# Patient Record
Sex: FEMALE | Race: WHITE | NOT HISPANIC OR LATINO | Employment: OTHER | URBAN - METROPOLITAN AREA
[De-identification: names, ages, dates, MRNs, and addresses within clinical notes are randomized per-mention and may not be internally consistent; named-entity substitution may affect disease eponyms.]

---

## 2023-10-24 ENCOUNTER — OFFICE VISIT (OUTPATIENT)
Dept: PRIMARY CARE | Facility: CLINIC | Age: 83
End: 2023-10-24
Payer: MEDICARE

## 2023-10-24 VITALS
SYSTOLIC BLOOD PRESSURE: 132 MMHG | HEART RATE: 73 BPM | TEMPERATURE: 98 F | OXYGEN SATURATION: 98 % | WEIGHT: 123 LBS | DIASTOLIC BLOOD PRESSURE: 58 MMHG | RESPIRATION RATE: 16 BRPM

## 2023-10-24 DIAGNOSIS — R53.83 MALAISE AND FATIGUE: ICD-10-CM

## 2023-10-24 DIAGNOSIS — M19.90 ARTHRITIS: ICD-10-CM

## 2023-10-24 DIAGNOSIS — Z78.0 POSTMENOPAUSAL: ICD-10-CM

## 2023-10-24 DIAGNOSIS — E78.5 HYPERLIPIDEMIA, UNSPECIFIED HYPERLIPIDEMIA TYPE: ICD-10-CM

## 2023-10-24 DIAGNOSIS — R53.81 MALAISE AND FATIGUE: ICD-10-CM

## 2023-10-24 PROCEDURE — 1036F TOBACCO NON-USER: CPT | Performed by: FAMILY MEDICINE

## 2023-10-24 PROCEDURE — 1159F MED LIST DOCD IN RCRD: CPT | Performed by: FAMILY MEDICINE

## 2023-10-24 PROCEDURE — 99203 OFFICE O/P NEW LOW 30 MIN: CPT | Performed by: FAMILY MEDICINE

## 2023-10-24 RX ORDER — GLUCOSAMINE/CHONDRO SU A 500-400 MG
1 TABLET ORAL 3 TIMES DAILY
COMMUNITY

## 2023-10-24 RX ORDER — DIPHENHYDRAMINE HCL 50 MG
50 CAPSULE ORAL NIGHTLY PRN
COMMUNITY
End: 2024-04-23

## 2023-10-24 RX ORDER — CALCIUM CARB/MAGNESIUM CARB 311-232MG
TABLET ORAL
COMMUNITY

## 2023-10-24 RX ORDER — UBIDECARENONE 75 MG
500 CAPSULE ORAL DAILY
COMMUNITY

## 2023-10-24 RX ORDER — ATORVASTATIN CALCIUM 20 MG/1
20 TABLET, FILM COATED ORAL DAILY
COMMUNITY
End: 2024-04-23 | Stop reason: SDUPTHER

## 2023-10-24 RX ORDER — CHOLECALCIFEROL (VITAMIN D3) 25 MCG
1000 TABLET ORAL DAILY
COMMUNITY

## 2023-10-24 RX ORDER — LISINOPRIL 10 MG/1
10 TABLET ORAL DAILY
COMMUNITY
End: 2024-04-23 | Stop reason: SDUPTHER

## 2023-10-24 RX ORDER — BISMUTH SUBSALICYLATE 262 MG
1 TABLET,CHEWABLE ORAL DAILY
COMMUNITY

## 2023-10-24 RX ORDER — ASCORBIC ACID 500 MG
500 TABLET ORAL DAILY
COMMUNITY

## 2023-10-24 RX ORDER — ASPIRIN 81 MG/1
81 TABLET ORAL DAILY
COMMUNITY

## 2023-10-24 RX ORDER — METOPROLOL TARTRATE 50 MG/1
50 TABLET ORAL 2 TIMES DAILY
COMMUNITY
End: 2024-04-23 | Stop reason: SDUPTHER

## 2023-10-24 ASSESSMENT — PATIENT HEALTH QUESTIONNAIRE - PHQ9
2. FEELING DOWN, DEPRESSED OR HOPELESS: NOT AT ALL
1. LITTLE INTEREST OR PLEASURE IN DOING THINGS: NOT AT ALL
SUM OF ALL RESPONSES TO PHQ9 QUESTIONS 1 AND 2: 0

## 2023-10-24 NOTE — PROGRESS NOTES
Subjective   Patient ID: Lorraine Loja is a 83 y.o. female who presents for [facility].    HPI  Presents today for above reason  Last PCP: Dr Ponce. Pt moved from [place] to [place]  How did pt find us: former pt  Last eye exam: 2 years  Last dental: 2 months  Eating healthy. Including: chicken, fish, fruit, vegetables  Little exercise  Sleeping well    Pt has significant family hx of breast cancer  Pt is in need of hearing aids, not sure if she needs referral    No other concerns today    Yearly BW ordered  DEXA screen ordered      Review of Systems  Constitutional:  no chills, no fever and no night sweats.  Eyes: no blurred vision and no eyesight problems.  ENT: no hearing loss, no nasal congestion, no hoarseness and no sore throat.  Neck: no mass (es) and no swelling.  Cardiovascular: no chest pain, no intermittent leg claudication, no lower extremity edema, no palpitation and no syncope.  Respiratory: no cough, no shortness of breath during exertion, no shortness of breath at rest and no wheezing.  Gastrointestinal: no abdominal pain, no blood in stools, no constipation, no diarrhea, no melena, no nausea, no rectal pain and no vomiting.  Genitourinary: no dysuria, no change in urinary frequency, no urinary hesitancy and no feelings of urinary urgency.  Musculoskeletal: no arthralgias, no back pain and no myalgias.  Integumentary: no new skin lesions and no rashes.  Neurological: no difficulty walking, no headache, no limb weakness, no numbness and no tingling.  Psychiatric/Behavioral: no anxiety, no depression, no anhedonia and no substance use disorders.  Endocrine: no recent weight gain and no recent weight loss.  Hematologic/Lymphatic: no tendency for easy bruising and no swollen glands    Objective   Physical Exam  Patiently recently moved back from [place] originally from [place] here to be closer to her grandchildren  She was a patient of ours before she moved she wants to reestablish care.   "History of hypertension and hyperlipidemia otherwise doing well send B12 deficiency also controlled with oral medication.  Well-developed well-nourished thin 83-year-old in no acute distress.  Physical exam today's office visit constitutional alert and oriented x3.    Head is atraumatic HEENT is within normal limits.    Neck supple no masses full range of motion.    Thyroid is normal in size no thyromegaly there is no carotid bruits.    Pulmonary exam shows clear to auscultation no respiratory distress.    Cardiovascular shows no murmur rub or gallop.  Regular rate and rhythm.    Abdominal exam soft nontender no hepatosplenomegaly or masses normal bowel sounds no rebound no guarding.    Musculoskeletal exam no joint pain no muscle pain full range of motion.    Psych exam normal mood and affect.    Dermatologic exam no skin lesions no rash no blemishes.    Neuro exam is no focal deficits.  Normal exam.    Extremities no edema normal pulses normal capillary refill.    /58   Pulse 73   Temp 36.7 °C (98 °F)   Resp 16   Wt 55.8 kg (123 lb)   SpO2 98%     No results found for: \"WBC\", \"HGB\", \"HCT\", \"MCV\", \"PLT\"    Assessment/Plan she is due to get baseline blood work and also bone density test done we will get those accomplished and call her with the results if she is doing well routine recheck 6 months or as needed  Problem List Items Addressed This Visit    None  Visit Diagnoses       Postmenopausal        Malaise and fatigue              "

## 2023-11-02 ENCOUNTER — LAB (OUTPATIENT)
Dept: LAB | Facility: LAB | Age: 83
End: 2023-11-02
Payer: MEDICARE

## 2023-11-02 DIAGNOSIS — R53.81 MALAISE AND FATIGUE: ICD-10-CM

## 2023-11-02 DIAGNOSIS — R53.83 MALAISE AND FATIGUE: ICD-10-CM

## 2023-11-02 DIAGNOSIS — E78.5 HYPERLIPIDEMIA, UNSPECIFIED HYPERLIPIDEMIA TYPE: ICD-10-CM

## 2023-11-02 LAB
ALBUMIN SERPL BCP-MCNC: 4.1 G/DL (ref 3.4–5)
ALP SERPL-CCNC: 58 U/L (ref 33–136)
ALT SERPL W P-5'-P-CCNC: 23 U/L (ref 7–45)
ANION GAP SERPL CALC-SCNC: 12 MMOL/L (ref 10–20)
AST SERPL W P-5'-P-CCNC: 25 U/L (ref 9–39)
BASOPHILS # BLD AUTO: 0.09 X10*3/UL (ref 0–0.1)
BASOPHILS NFR BLD AUTO: 1 %
BILIRUB SERPL-MCNC: 0.6 MG/DL (ref 0–1.2)
BUN SERPL-MCNC: 19 MG/DL (ref 6–23)
CALCIUM SERPL-MCNC: 10.1 MG/DL (ref 8.6–10.3)
CHLORIDE SERPL-SCNC: 103 MMOL/L (ref 98–107)
CHOLEST SERPL-MCNC: 164 MG/DL (ref 0–199)
CHOLESTEROL/HDL RATIO: 3.5
CO2 SERPL-SCNC: 29 MMOL/L (ref 21–32)
CREAT SERPL-MCNC: 0.79 MG/DL (ref 0.5–1.05)
EOSINOPHIL # BLD AUTO: 0.06 X10*3/UL (ref 0–0.4)
EOSINOPHIL NFR BLD AUTO: 0.7 %
ERYTHROCYTE [DISTWIDTH] IN BLOOD BY AUTOMATED COUNT: 12.5 % (ref 11.5–14.5)
GFR SERPL CREATININE-BSD FRML MDRD: 75 ML/MIN/1.73M*2
GLUCOSE SERPL-MCNC: 99 MG/DL (ref 74–99)
HCT VFR BLD AUTO: 42.3 % (ref 36–46)
HDLC SERPL-MCNC: 46.8 MG/DL
HGB BLD-MCNC: 13.9 G/DL (ref 12–16)
IMM GRANULOCYTES # BLD AUTO: 0.02 X10*3/UL (ref 0–0.5)
IMM GRANULOCYTES NFR BLD AUTO: 0.2 % (ref 0–0.9)
LDLC SERPL CALC-MCNC: 88 MG/DL
LYMPHOCYTES # BLD AUTO: 2.08 X10*3/UL (ref 0.8–3)
LYMPHOCYTES NFR BLD AUTO: 24.2 %
MCH RBC QN AUTO: 31.9 PG (ref 26–34)
MCHC RBC AUTO-ENTMCNC: 32.9 G/DL (ref 32–36)
MCV RBC AUTO: 97 FL (ref 80–100)
MONOCYTES # BLD AUTO: 0.81 X10*3/UL (ref 0.05–0.8)
MONOCYTES NFR BLD AUTO: 9.4 %
NEUTROPHILS # BLD AUTO: 5.55 X10*3/UL (ref 1.6–5.5)
NEUTROPHILS NFR BLD AUTO: 64.5 %
NON HDL CHOLESTEROL: 117 MG/DL (ref 0–149)
NRBC BLD-RTO: 0 /100 WBCS (ref 0–0)
PLATELET # BLD AUTO: 416 X10*3/UL (ref 150–450)
POTASSIUM SERPL-SCNC: 4.6 MMOL/L (ref 3.5–5.3)
PROT SERPL-MCNC: 6.9 G/DL (ref 6.4–8.2)
RBC # BLD AUTO: 4.36 X10*6/UL (ref 4–5.2)
SODIUM SERPL-SCNC: 139 MMOL/L (ref 136–145)
TRIGL SERPL-MCNC: 147 MG/DL (ref 0–149)
VLDL: 29 MG/DL (ref 0–40)
WBC # BLD AUTO: 8.6 X10*3/UL (ref 4.4–11.3)

## 2023-11-02 PROCEDURE — 80053 COMPREHEN METABOLIC PANEL: CPT

## 2023-11-02 PROCEDURE — 85025 COMPLETE CBC W/AUTO DIFF WBC: CPT

## 2023-11-02 PROCEDURE — 80061 LIPID PANEL: CPT

## 2023-11-02 PROCEDURE — 36415 COLL VENOUS BLD VENIPUNCTURE: CPT

## 2023-11-06 ENCOUNTER — APPOINTMENT (OUTPATIENT)
Dept: RADIOLOGY | Facility: CLINIC | Age: 83
End: 2023-11-06
Payer: MEDICARE

## 2023-12-12 ENCOUNTER — OFFICE VISIT (OUTPATIENT)
Dept: PRIMARY CARE | Facility: CLINIC | Age: 83
End: 2023-12-12
Payer: MEDICARE

## 2023-12-12 ENCOUNTER — OFFICE VISIT (OUTPATIENT)
Dept: ORTHOPEDIC SURGERY | Facility: CLINIC | Age: 83
End: 2023-12-12
Payer: MEDICARE

## 2023-12-12 ENCOUNTER — ANCILLARY PROCEDURE (OUTPATIENT)
Dept: RADIOLOGY | Facility: CLINIC | Age: 83
End: 2023-12-12
Payer: MEDICARE

## 2023-12-12 VITALS
RESPIRATION RATE: 16 BRPM | TEMPERATURE: 97.7 F | SYSTOLIC BLOOD PRESSURE: 122 MMHG | HEART RATE: 68 BPM | OXYGEN SATURATION: 99 % | DIASTOLIC BLOOD PRESSURE: 60 MMHG | WEIGHT: 122.2 LBS

## 2023-12-12 DIAGNOSIS — S43.004S SHOULDER DISLOCATION, RIGHT, SEQUELA: ICD-10-CM

## 2023-12-12 DIAGNOSIS — S42.301A CLOSED FRACTURE OF SHAFT OF RIGHT HUMERUS, UNSPECIFIED FRACTURE MORPHOLOGY, INITIAL ENCOUNTER: ICD-10-CM

## 2023-12-12 DIAGNOSIS — S43.004A DISLOCATION OF RIGHT SHOULDER JOINT, INITIAL ENCOUNTER: Primary | ICD-10-CM

## 2023-12-12 PROCEDURE — 1159F MED LIST DOCD IN RCRD: CPT | Performed by: FAMILY MEDICINE

## 2023-12-12 PROCEDURE — 1160F RVW MEDS BY RX/DR IN RCRD: CPT | Performed by: FAMILY MEDICINE

## 2023-12-12 PROCEDURE — 1036F TOBACCO NON-USER: CPT | Performed by: FAMILY MEDICINE

## 2023-12-12 PROCEDURE — 73030 X-RAY EXAM OF SHOULDER: CPT | Mod: RT,FY

## 2023-12-12 PROCEDURE — 99213 OFFICE O/P EST LOW 20 MIN: CPT | Performed by: FAMILY MEDICINE

## 2023-12-12 PROCEDURE — 99204 OFFICE O/P NEW MOD 45 MIN: CPT | Performed by: FAMILY MEDICINE

## 2023-12-12 PROCEDURE — 73030 X-RAY EXAM OF SHOULDER: CPT | Mod: RIGHT SIDE | Performed by: FAMILY MEDICINE

## 2023-12-12 PROCEDURE — 99214 OFFICE O/P EST MOD 30 MIN: CPT | Performed by: FAMILY MEDICINE

## 2023-12-12 RX ORDER — CYCLOBENZAPRINE HCL 5 MG
5 TABLET ORAL NIGHTLY
Qty: 14 TABLET | Refills: 0 | Status: SHIPPED | OUTPATIENT
Start: 2023-12-12 | End: 2023-12-26

## 2023-12-12 RX ORDER — CYCLOBENZAPRINE HCL 5 MG
5 TABLET ORAL NIGHTLY
Qty: 14 TABLET | Refills: 0 | Status: CANCELLED | OUTPATIENT
Start: 2023-12-12 | End: 2023-12-26

## 2023-12-12 NOTE — PROGRESS NOTES
Subjective   Patient ID: Lorraine Loja is a 83 y.o. female who presents for Hospital Follow-up and dislocated shoulder.    HPI  Pt reports above concern FOR OFFICE VISIT  Pt was seen at [facility] 12/11/2023  Pt tripped on sidewalk over a brick and fell on RIGHT side  Xray showed dislocation of shoulder  Pt reports: soreness present  Pt has tried:using Advil/Tylenol duel    No new concerns    Review of Systems  Constitutional:  no chills, no fever and no night sweats.  Eyes: no blurred vision and no eyesight problems.  ENT: no hearing loss, no nasal congestion, no hoarseness and no sore throat.  Neck: no mass (es) and no swelling.  Cardiovascular: no chest pain, no intermittent leg claudication, no lower extremity edema, no palpitation and no syncope.  Respiratory: no cough, no shortness of breath during exertion, no shortness of breath at rest and no wheezing.  Gastrointestinal: no abdominal pain, no blood in stools, no constipation, no diarrhea, no melena, no nausea, no rectal pain and no vomiting.  Genitourinary: no dysuria, no change in urinary frequency, no urinary hesitancy and no feelings of urinary urgency.  Musculoskeletal: no arthralgias, no back pain and no myalgias.  Integumentary: no new skin lesions and no rashes.  Neurological: no difficulty walking, no headache, no limb weakness, no numbness and no tingling.  Psychiatric/Behavioral: no anxiety, no depression, no anhedonia and no substance use disorders.  Endocrine: no recent weight gain and no recent weight loss.  Hematologic/Lymphatic: no tendency for easy bruising and no swollen glands    Objective   Physical Exam  Patient in for follow-up was in [facility] visiting her grandson at his college tripped and fell and had an injury was seen to the emergency room in [place] dislocated right shoulder and found to have an old fracture of the humerus nonhealing says that happened 10 years ago.  The shoulder was reduced she has pain  and tenderness she is in a splint sling advised to follow-up with orthopedics pain is well-controlled she has been using some Motrin and some Tylenol lungs clear cardiac and abdominal exams are benign.  /60   Pulse 68   Temp 36.5 °C (97.7 °F)   Resp 16   Wt 55.4 kg (122 lb 3.2 oz)   SpO2 99%     Lab Results   Component Value Date    WBC 8.6 11/02/2023    HGB 13.9 11/02/2023    HCT 42.3 11/02/2023    MCV 97 11/02/2023     11/02/2023       Assessment/Plan plan will get her into see orthopedics this afternoon for evaluation and recommendation I will follow-up with her in 2 to 3 weeks.  Problem List Items Addressed This Visit    None

## 2023-12-12 NOTE — PROGRESS NOTES
Acute Injury New Patient Visit    CC:   Chief Complaint   Patient presents with    Right Shoulder - Pain     Rt shoulder dislocation 12/8/23  Xrays and reduction in [place]  Xrays today       HPI: Lorraine is a 83 y.o.female who presents today with new complaints of right shoulder pain status post dislocation and fall.  She had her shoulder reduced outside in [place].  She presents here today for further evaluation.  She denies any numbness tingling or burning no history of prior injury or trauma outside this event where she did slip and fall in the snow and ice.  She presents with isolated injury to the right shoulder only.        Review of Systems   GENERAL: Negative for malaise, significant weight loss, fever  MUSCULOSKELETAL: See HPI  NEURO: Negative for numbness / tingling     Past Medical History  Past Medical History:   Diagnosis Date    HTN (hypertension)     Hyperlipidemia        Medication review  Medication Documentation Review Audit       Reviewed by Cole C Budinsky, MD (Physician) on 12/12/23 at 1647      Medication Order Taking? Sig Documenting Provider Last Dose Status   ascorbic acid (Vitamin C) 500 mg tablet [id number]  Take 1 tablet (500 mg) by mouth once daily. Historical Provider, MD  Active   aspirin 81 mg EC tablet [id number]  Take 1 tablet (81 mg) by mouth once daily. Historical Provider, MD  Active   atorvastatin (Lipitor) 20 mg tablet [id number]  Take 1 tablet (20 mg) by mouth once daily. Historical Provider, MD  Active   CALCIUM CITRATE ORAL [id number]  Take 400 mg by mouth. Historical Provider, MD  Active   cholecalciferol (Vitamin D3) 25 MCG (1000 UT) tablet [id number]  Take 1 tablet (1,000 Units) by mouth once daily. Historical Provider, MD  Active   cyanocobalamin (Vitamin B-12) 500 mcg tablet [id number]  Take 1 tablet (500 mcg) by mouth once daily. Historical Provider, MD  Active   cyclobenzaprine (Flexeril) 5 mg tablet [id number]  Take 1 tablet (5 mg) by mouth once daily at bedtime for 14  days. Cole C Budinsky, MD  Active   diphenhydrAMINE (BENADryl) 50 mg capsule [id number]  Take 1 capsule (50 mg) by mouth as needed at bedtime for itching. Historical Provider, MD  Active   glucosamine-chondroitin 500-400 mg tablet [id number]  Take 1 tablet by mouth 3 times a day. Historical Provider, MD  Active   lisinopril 10 mg tablet [id number]  Take 1 tablet (10 mg) by mouth once daily. Historical Provider, MD  Active   melatonin 5 mg tablet,disintegrating [id number]  Take by mouth. Historical Provider, MD  Active   metoprolol tartrate (Lopressor) 50 mg tablet [id number]  Take 1 tablet by mouth 2 times a day. Historical Provider, MD  Active   multivitamin tablet [id number]  Take 1 tablet by mouth once daily. Historical Provider, MD  Active                    Allergies  Allergies   Allergen Reactions    Sulfa (Sulfonamide Antibiotics) Other     Oral rash       Social History  Social History     Socioeconomic History    Marital status:      Spouse name: Not on file    Number of children: Not on file    Years of education: Not on file    Highest education level: Not on file   Occupational History    Not on file   Tobacco Use    Smoking status: Never    Smokeless tobacco: Never   Vaping Use    Vaping Use: Never used   Substance and Sexual Activity    Alcohol use: Yes     Comment: rare    Drug use: Never    Sexual activity: Not on file   Other Topics Concern    Not on file   Social History Narrative    Not on file     Social Determinants of Health     Financial Resource Strain: Not on file   Food Insecurity: Not on file   Transportation Needs: Not on file   Physical Activity: Not on file   Stress: Not on file   Social Connections: Not on file   Intimate Partner Violence: Not on file   Housing Stability: Not on file       Surgical History  Past Surgical History:   Procedure Laterality Date    JOINT REPLACEMENT         Physical Exam:  GENERAL:  Patient is awake, alert, and oriented to person place and time.  Patient  appears well nourished and well kept.  Affect Calm, Not Acutely Distressed.  HEENT:  Normocephalic, Atraumatic, EOMI  CARDIOVASCULAR:  Hemodynamically stable.  RESPIRATORY:  Normal respirations with unlabored breathing.  NEURO: Gross sensation intact to the upper extremities bilaterally.  Extremity: Shoulder exam limited range of motion and strength secondary to injury distal pulses and sensation are intact forearm compartment soft compressible full range of motion about the elbow normal pronation supination no laxity with valgus stress.  No pain at the distal clavicle or AC joint.  Mild global tenderness circumferentially about the proximal humerus.  Limited range of motion 45 degrees forward flexion 45 lateral abduction.      Diagnostics: Right shoulder films demonstrate normal-appearing glenohumeral joint.        Procedure: None  Procedures    Assessment:   Problem List Items Addressed This Visit    None  Visit Diagnoses       Shoulder dislocation, right, sequela        Relevant Medications    cyclobenzaprine (Flexeril) 5 mg tablet    Other Relevant Orders    XR shoulder right 2+ views             Plan: At this time we will offer the patient a short course of a muscle relaxer in addition to relative rest with sling on x 3 weeks.  Will see her back in 3 weeks for repeat evaluation we will defer repeat x-rays unless there is more injury or new injury or trauma.  Will likely transition out of the sling at that time and have her enter into a short course of physical therapy.  We discussed the nonoperative nature of her injury which she is in agreement of today.  Orders Placed This Encounter    XR shoulder right 2+ views    cyclobenzaprine (Flexeril) 5 mg tablet      At the conclusion of the visit there were no further questions by the patient/family regarding their plan of care.  Patient was instructed to call or return with any issues, questions, or concerns regarding their injury and/or treatment plan described  above.     12/12/23 at 4:48 PM - Cole C Budinsky, MD    Office: [phone]    This note was prepared using voice recognition software.  The details of this note are correct and have been reviewed, and corrected to the best of my ability.  Some grammatical errors may persist related to the Dragon software.

## 2024-01-05 ENCOUNTER — OFFICE VISIT (OUTPATIENT)
Dept: ORTHOPEDIC SURGERY | Facility: CLINIC | Age: 84
End: 2024-01-05
Payer: MEDICARE

## 2024-01-05 DIAGNOSIS — S43.004S SHOULDER DISLOCATION, RIGHT, SEQUELA: ICD-10-CM

## 2024-01-05 PROCEDURE — 1159F MED LIST DOCD IN RCRD: CPT | Performed by: FAMILY MEDICINE

## 2024-01-05 PROCEDURE — 99213 OFFICE O/P EST LOW 20 MIN: CPT | Performed by: FAMILY MEDICINE

## 2024-01-05 PROCEDURE — 1160F RVW MEDS BY RX/DR IN RCRD: CPT | Performed by: FAMILY MEDICINE

## 2024-01-05 PROCEDURE — 1036F TOBACCO NON-USER: CPT | Performed by: FAMILY MEDICINE

## 2024-01-05 NOTE — PROGRESS NOTES
Established Patient Follow-Up Visit    CC:   Chief Complaint   Patient presents with    Right Shoulder - Follow-up     Dislocation  DOI: 12/8/23   Re evaluate today       HPI:  Lorraine is a 83 y.o. female returns here today for follow-up visit regarding: Right shoulder pain dislocation.  Presents here today for follow-up.  She states a little bit of soreness has improved range of motion has been weaning herself from the sling.  States a little bit of intermittent burning but denies numbness or tingling.          REVIEW OF SYSTEMS:  GENERAL: Negative for malaise, significant weight loss, fever  MUSCULOSKELETAL: See HPI  NEURO: Negative for numbness / tingling       PHYSICAL EXAM:  -Neuro: Gross sensation intact to the upper extremities bilaterally.  -Extremity: Right shoulder demonstrates mild soft tissue tenderness over the subacromial bursa and deltoid.  There is no anterior pain she has full forward flexion equal and symmetric bilaterally at 90 degrees abduction without any issue normal internal and external rotation.  Biceps triceps and forearm compartment soft compressible with 5 out of 5 strength throughout the remainder the right upper extremity.    IMAGING: No new images today      PROCEDURE: None  Procedures     ASSESSMENT:   Follow-up visit for:  Problem List Items Addressed This Visit    None  Visit Diagnoses       Shoulder dislocation, right, sequela        Relevant Orders    Referral to Physical Therapy             PLAN: At this time we will transition patient into physical therapy we will see her back in 6 weeks for repeat evaluation if still having pain and discomfort at that time we discussed the ability to provide her with a steroid injection.  She may wean from the sling as able.  She can use ice heat topical muscle rubs creams as well for any soft tissue discomfort.  Additionally patient was curious about upcoming dental surgery which is very pricey and she is worried about the screws that may  not be able to take she states a history of delayed healing and poor bone.  Recommended she follow-up with her primary care doc for further workup and evaluation and potential labs and bone density scans prior to making the decision to go forward with an expensive procedure in her mouth.  Orders Placed This Encounter    Referral to Physical Therapy           At the conclusion of the visit there were no further questions by the patient/family regarding their plan of care.  Patient was instructed to call or return with any issues, questions, or concerns regarding their injury and/or treatment plan described above.     01/05/24 at 10:22 AM - Cole C Budinsky, MD    Office: [phone]    This note was prepared using voice recognition software.  The details of this note are correct and have been reviewed, and corrected to the best of my ability.  Some grammatical errors may persist related to the Dragon software.

## 2024-01-10 ENCOUNTER — EVALUATION (OUTPATIENT)
Dept: PHYSICAL THERAPY | Facility: CLINIC | Age: 84
End: 2024-01-10
Payer: MEDICARE

## 2024-01-10 DIAGNOSIS — S43.004D CLOSED DISLOCATION OF RIGHT SHOULDER, SUBSEQUENT ENCOUNTER: ICD-10-CM

## 2024-01-10 PROCEDURE — 97110 THERAPEUTIC EXERCISES: CPT | Mod: GP | Performed by: PHYSICAL THERAPIST

## 2024-01-10 PROCEDURE — 97161 PT EVAL LOW COMPLEX 20 MIN: CPT | Mod: GP | Performed by: PHYSICAL THERAPIST

## 2024-01-10 ASSESSMENT — PAIN SCALES - GENERAL: PAINLEVEL_OUTOF10: 0 - NO PAIN

## 2024-01-10 ASSESSMENT — PAIN - FUNCTIONAL ASSESSMENT: PAIN_FUNCTIONAL_ASSESSMENT: 0-10

## 2024-01-10 ASSESSMENT — ENCOUNTER SYMPTOMS
OCCASIONAL FEELINGS OF UNSTEADINESS: 0
DEPRESSION: 0
LOSS OF SENSATION IN FEET: 0

## 2024-01-10 ASSESSMENT — PAIN DESCRIPTION - DESCRIPTORS: DESCRIPTORS: SHARP

## 2024-01-10 NOTE — PATIENT INSTRUCTIONS
Access Code: [email or web address]  URL: [email or web address]  Date: 01/10/2024  Prepared by: Kris Avila    Exercises  - Seated Shoulder Flexion Towel Slide at Table Top  - 1 x daily - 7 x weekly - 1 sets - 10 reps - 5 sec hold  - Seated Shoulder Scaption Slide at Table Top with Forearm in Neutral  - 1 x daily - 7 x weekly - 1 sets - 10 reps - 5 sec hold  - Seated Shoulder External Rotation PROM on Table  - 1 x daily - 7 x weekly - 1 sets - 10 reps - 5 sec hold

## 2024-01-10 NOTE — PROGRESS NOTES
Physical Therapy    Physical Therapy Evaluation and Treatment      Patient Name: Lorraine Loja  MRN: [id number]  Today's Date: 1/10/2024  Time Calculation  Start Time: 1735  Stop Time: 1817  Time Calculation (min): 42 min    Insurance:  [place] Medicare Adv  $35 copay  0% coinsurance  PA req  Visit: 1    Assessment:  82 y/o F presents to outpatient physical therapy with reports of right shoulder pain d/t dislocation following fall. The patient presents with the current impairments of pain, decreased ROM, weakness, and impaired posture. These impairments currently limit their ability to reach for objects, dress, bathe, sleep, and lift/carry objects. Due to the limitations listed above, the patient is currently at a decreased functional level compared to baseline, and they would benefit from skilled physical therapy to improve pain intensity, strength, flexibility, posture, and facilitate a safe and efficient return to functional baseline. Patient's prognosis for improvement with therapy is good at this time.  PT Assessment  PT Assessment Results: Decreased strength, Decreased range of motion, Pain  Rehab Prognosis: Good  Evaluation/Treatment Tolerance: Patient tolerated treatment well     Plan:  OP PT Plan  Treatment/Interventions: Aquatic therapy, Cryotherapy, Dry needling, Education/ Instruction, Electrical stimulation, Hot pack, Manual therapy, Neuromuscular re-education, Self care/ home management, Taping techniques, Therapeutic activities, Therapeutic exercises, Ultrasound, Vasopneumatic device  PT Plan: Skilled PT  PT Frequency: 1 time per week  Duration: 7 visits  Onset Date: 01/05/24  Certification Period Start Date: 01/10/24  Certification Period End Date: 04/09/24  Rehab Potential: Good  Plan of Care Agreement: Patient      Complexity:  Low    Current Problem:   1. Closed dislocation of right shoulder, subsequent encounter  Referral to Physical Therapy    Follow Up In Physical Therapy          Subjective     Patient reports that in early December she was in [place] she tripped when walking on bricks and fell forward and dislocated her shoulder. She went to the ED and eventually had it reduced after 7 hours. Since the injury she has noted some intermittent pain in the right shoulder, especially when she goes to reach out to the side, and when she is sleeping on it. She was in a sling up until about last week, but she does still use it when she is around her grand kids. Pain is a 0/10 at this time, 5/10 at worst, and 0/10 at best. Endorses tingling in the bilateral hands but no increase in those symptoms since her shoulder injury.     General:  General  Reason for Referral: R shoulder pain following dislocation  Referred By: Dr. Cole Budinsky  Past Medical History Relevant to Rehab: hx of HTN, UTI  Precautions:  Precautions  STEADI Fall Risk Score (The score of 4 or more indicates an increased risk of falling): 7  Precautions Comment: hx of HTN, UTI  Pain:  Pain Assessment  Pain Assessment: 0-10  Pain Score: 0 - No pain  Pain Type: Acute pain  Pain Location: Shoulder  Pain Orientation: Right  Pain Descriptors: Sharp  Home Living:  Home Living  Home Living Comment: Patient lives alone and is able to do all of her daily activities  Prior Level of Function:  Prior Function Per Pt/Caregiver Report  Prior Function Comments: Patient independent in all daily activites and desired activities    Objective   Shoulder AROM (*indicates pain):  Flexion R 152*, L 171  Abd R 89*, L 162  IR R L4*, L L1  ER R C1, L C4    Shoulder PROM (*indicates pain):  Flexion R 159*, L NT  Abd R 128*, L NT  IR R 58*, L NT  ER R 72*, L NT    Cervical ROM (*indicates pain):   Flexion 58  Extension 52  RSB 32  LSB 28  Rrot 52  LROT 59    Shoulder strength (*indicates pain):  Flexion R 4-/5*, L 4+/5  Abd  R 4-/5*, L 4+/5  IR  R 4-/5*, L 4+/5  ER R 4-/5*, L 4+/5    Posture: forward head, rounded shoulders, R shoulder elevated compared to L  Dermatomes:  intact  Palpation: TTP to the right sided bicipital groove on the right, mild tenderness to the AC joint on the right    Outcome Measures:  Other Measures  Disability of Arm Shoulder Hand (DASH): 22; 25.00%     Treatments:  Therapeutic exercises:  Table slides flexion x10  Table slides abduction x10  Table ER x10  (10')    EDUCATION:  Outpatient Education  Individual(s) Educated: Patient  Education Provided: Anatomy, Body Mechanics, Home Exercise Program, Physiology, POC, Posture  Risk and Benefits Discussed with Patient/Caregiver/Other: yes  Patient/Caregiver Demonstrated Understanding: yes  Plan of Care Discussed and Agreed Upon: yes  Patient Response to Education: Patient/Caregiver Verbalized Understanding of Information, Patient/Caregiver Performed Return Demonstration of Exercises/Activities, Patient/Caregiver Asked Appropriate Questions  Exercises  - Seated Shoulder Flexion Towel Slide at Table Top  - 1 x daily - 7 x weekly - 1 sets - 10 reps - 5 sec hold  - Seated Shoulder Scaption Slide at Table Top with Forearm in Neutral  - 1 x daily - 7 x weekly - 1 sets - 10 reps - 5 sec hold  - Seated Shoulder External Rotation PROM on Table  - 1 x daily - 7 x weekly - 1 sets - 10 reps - 5 sec hold    Goals:  By discharge:  1. Patient will report and demonstrate independence with established HEP  2. Patient will report a decrease in shoulder pain by 75% to improve ability to lift, reach, and dress without restrictions  3. Patient will demonstrate active shoulder flexion to 165, abduction to 145 deg, ER to C4, and IR to L1 to improve their ability to perform daily tasks such as dressing, bathing, and reaching for objects  4. Patient will demonstrate gross shoulder strength of >/= 4+/5 to increase their ability to perform all daily and work tasks  5. Patient will demonstrate a decrease in QuickDash score by 11 points to </=  11/55 to meet established MCID for the outcome measure (baseline 1/10/24 22/55)  6. Patient will  report >/= 50% improvement in sleeping ability since starting PT including ability to fall asleep, stay asleep, as well as pain when waking to improve overall function   7. Patient will demonstrate the ability to lift a 3lb weight onto a shelf above shoulder height 5x consecutively and without pain exceeding 2/10 to improve her ability to perform light tasks within the home  8. Patient will demonstrate the ability to perform active shoulder elevation against gravity >/= 140 deg while maintaining proper mechanics of the shoulder and without compensatory activation of the upper trapezius

## 2024-01-16 ENCOUNTER — TREATMENT (OUTPATIENT)
Dept: PHYSICAL THERAPY | Facility: CLINIC | Age: 84
End: 2024-01-16
Payer: MEDICARE

## 2024-01-16 DIAGNOSIS — S43.004D CLOSED DISLOCATION OF RIGHT SHOULDER, SUBSEQUENT ENCOUNTER: Primary | ICD-10-CM

## 2024-01-16 PROCEDURE — 97110 THERAPEUTIC EXERCISES: CPT | Mod: GP,CQ

## 2024-01-16 ASSESSMENT — PAIN - FUNCTIONAL ASSESSMENT: PAIN_FUNCTIONAL_ASSESSMENT: 0-10

## 2024-01-16 ASSESSMENT — PAIN SCALES - GENERAL: PAINLEVEL_OUTOF10: 5 - MODERATE PAIN

## 2024-01-16 NOTE — PROGRESS NOTES
Physical Therapy    Physical Therapy Treatment    Patient Name: Lorraine Loja  MRN: [id number]  Today's Date: 1/16/2024  Time Calculation  Start Time: 1100  Stop Time: 1140  Time Calculation (min): 40 min  Insurance:  [place]  $35 copay  0% coinsurance  PA req  Visit: 2  Assessment:  PT Assessment  PT Assessment Results: Decreased strength, Decreased range of motion, Pain Pt had no complaints of increased pain during or after treatment. Pt would benefit from PT to continue to address impairments in order to improve strength, flexibility, posture, and body mechanics and to decrease symptoms and increase overall function.   Plan:  OP PT Plan  PT Plan: Skilled PTOP PT Plan   Contrinue to progress right shoulder strengthening as cassie     Treatment/Interventions: Aquatic therapy, Cryotherapy, Dry needling, Education/ Instruction, Electrical stimulation, Hot pack, Manual therapy, Neuromuscular re-education, Self care/ home management, Taping techniques, Therapeutic activities, Therapeutic exercises, Ultrasound, Vasopneumatic device  PT Plan: Skilled PT  PT Frequency: 1 time per week  Duration: 7 visits  Onset Date: 01/05/24  Certification Period Start Date: 01/10/24  Certification Period End Date: 04/09/24    Current Problem  1. Closed dislocation of right shoulder, subsequent encounter  Follow Up In Physical Therapy          General   Reason for Referral: R shoulder pain following dislocation  Referred By: Dr. Cole Budinsky  Past Medical History Relevant to Rehab: hx of HTN, UTI       Subjective    Pt states that her right shoulder is sore today. She states that she feels that the exercises are making her a little sore.   Precautions  Precautions  STEADI Fall Risk Score (The score of 4 or more indicates an increased risk of falling): 7  Precautions Comment: hx of HTN, UTI      Pain  Pain Assessment  Pain Assessment: 0-10  Pain Score: 5 - Moderate pain  Pain Type: Acute pain  Pain Location: Shoulder  Pain  Orientation: Right    Objective        AROM right shoulder  ER 90              Treatments:       Therapeutic exercises:  Table slides flexion x10  Table slides abduction x10  Table ER x10    Rows GTT x15  Shoulder extension GTT x15  Shoulder IR/ER step outs RTT x10 ea   Shoulder isometric flexion, IR/ER 5 sec x10    EDUCATION:  Instructed patient in isometric shoulder flexion, IR, ER for HEP    Goals:  By discharge:  1. Patient will report and demonstrate independence with established HEP  2. Patient will report a decrease in shoulder pain by 75% to improve ability to lift, reach, and dress without restrictions  3. Patient will demonstrate active shoulder flexion to 165, abduction to 145 deg, ER to C4, and IR to L1 to improve their ability to perform daily tasks such as dressing, bathing, and reaching for objects  4. Patient will demonstrate gross shoulder strength of >/= 4+/5 to increase their ability to perform all daily and work tasks  5. Patient will demonstrate a decrease in QuickDash score by 11 points to </=  11/55 to meet established MCID for the outcome measure (baseline 1/10/24 22/55)  6. Patient will report >/= 50% improvement in sleeping ability since starting PT including ability to fall asleep, stay asleep, as well as pain when waking to improve overall function   7. Patient will demonstrate the ability to lift a 3lb weight onto a shelf above shoulder height 5x consecutively and without pain exceeding 2/10 to improve her ability to perform light tasks within the home  8. Patient will demonstrate the ability to perform active shoulder elevation against gravity >/= 140 deg while maintaining proper mechanics of the shoulder and without compensatory activation of the upper trapezius

## 2024-01-23 ENCOUNTER — TREATMENT (OUTPATIENT)
Dept: PHYSICAL THERAPY | Facility: CLINIC | Age: 84
End: 2024-01-23
Payer: MEDICARE

## 2024-01-23 DIAGNOSIS — S43.004D CLOSED DISLOCATION OF RIGHT SHOULDER, SUBSEQUENT ENCOUNTER: Primary | ICD-10-CM

## 2024-01-23 PROCEDURE — 97110 THERAPEUTIC EXERCISES: CPT | Mod: GP,CQ

## 2024-01-23 ASSESSMENT — PAIN SCALES - GENERAL: PAINLEVEL_OUTOF10: 0 - NO PAIN

## 2024-01-23 ASSESSMENT — PAIN - FUNCTIONAL ASSESSMENT: PAIN_FUNCTIONAL_ASSESSMENT: 0-10

## 2024-01-23 NOTE — PROGRESS NOTES
Physical Therapy    Physical Therapy Treatment    Patient Name: Lorraine Loja  MRN: [id number]  Today's Date: 1/23/2024  Time Calculation  Start Time: 1104  Stop Time: 1144  Time Calculation (min): 40 min  Insurance:  [place] Medicare Adv  $35 copay  0% coinsurance  PA req  Visit: 3  Assessment:  PT Assessment  PT Assessment Results: Decreased strength, Decreased range of motion, Pain Pt had no complaints of increased pain during or after treatment. Verbal and tactile cues required for correct performance of exercises. Corrected technique of isometric exercises and patient able to perform without any pain. Pt would benefit from PT to continue to address impairments in order to improve strength, flexibility, posture, and body mechanics and to decrease symptoms and increase overall function.   Plan:  OP PT Plan  PT Plan: Skilled PTOP PT Plan   Contrinue to progress right shoulder strengthening as cassie     Treatment/Interventions: Aquatic therapy, Cryotherapy, Dry needling, Education/ Instruction, Electrical stimulation, Hot pack, Manual therapy, Neuromuscular re-education, Self care/ home management, Taping techniques, Therapeutic activities, Therapeutic exercises, Ultrasound, Vasopneumatic device  PT Plan: Skilled PT  PT Frequency: 1 time per week  Duration: 7 visits  Onset Date: 01/05/24  Certification Period Start Date: 01/10/24  Certification Period End Date: 04/09/24    Current Problem  1. Closed dislocation of right shoulder, subsequent encounter  Follow Up In Physical Therapy            General   Reason for Referral: R shoulder pain following dislocation  Referred By: Dr. Cole Budinsky  Past Medical History Relevant to Rehab: hx of HTN, UTI       Subjective    Pt states that her right shoulder is sore today with movement. No resting pain. She states that she feels that the isometric exercises are making her a little sore.   Precautions  Precautions  STEADI Fall Risk Score (The score of 4 or more indicates an  increased risk of falling): 7  Precautions Comment: hx of HTN, UTI      Pain  Pain Assessment  Pain Assessment: 0-10  Pain Score: 0 - No pain  Pain Type: Acute pain  Pain Location: Shoulder  Pain Orientation: Right    Objective      Corrected technique with isometrics  Verbal cues for correct performance of exercises and to avoid upper trap/levator scap substitution              Treatments:       Therapeutic exercises:  Table slides flexion x10  Table slides abduction x10  Table ER x10  Pulleys 2 min  Rows RTT x10  Shoulder extension RTT x10  Shoulder IR/ER step outs RTT x10 ea   Shoulder isometric flexion, IR/ER 5 sec x10   Supine boxes x10 ccw/cw  SL shoulder abd to 60 x10  SL shoulder ER x10    EDUCATION:  Instructed patient in isometric shoulder flexion, IR, ER for HEP    Goals:  By discharge:  1. Patient will report and demonstrate independence with established HEP  2. Patient will report a decrease in shoulder pain by 75% to improve ability to lift, reach, and dress without restrictions  3. Patient will demonstrate active shoulder flexion to 165, abduction to 145 deg, ER to C4, and IR to L1 to improve their ability to perform daily tasks such as dressing, bathing, and reaching for objects  4. Patient will demonstrate gross shoulder strength of >/= 4+/5 to increase their ability to perform all daily and work tasks  5. Patient will demonstrate a decrease in QuickDash score by 11 points to </=  11/55 to meet established MCID for the outcome measure (baseline 1/10/24 22/55)  6. Patient will report >/= 50% improvement in sleeping ability since starting PT including ability to fall asleep, stay asleep, as well as pain when waking to improve overall function   7. Patient will demonstrate the ability to lift a 3lb weight onto a shelf above shoulder height 5x consecutively and without pain exceeding 2/10 to improve her ability to perform light tasks within the home  8. Patient will demonstrate the ability to perform  active shoulder elevation against gravity >/= 140 deg while maintaining proper mechanics of the shoulder and without compensatory activation of the upper trapezius

## 2024-01-30 ENCOUNTER — TREATMENT (OUTPATIENT)
Dept: PHYSICAL THERAPY | Facility: CLINIC | Age: 84
End: 2024-01-30
Payer: MEDICARE

## 2024-01-30 DIAGNOSIS — S43.004D CLOSED DISLOCATION OF RIGHT SHOULDER, SUBSEQUENT ENCOUNTER: Primary | ICD-10-CM

## 2024-01-30 PROCEDURE — 97110 THERAPEUTIC EXERCISES: CPT | Mod: GP,CQ

## 2024-01-30 ASSESSMENT — PAIN - FUNCTIONAL ASSESSMENT: PAIN_FUNCTIONAL_ASSESSMENT: 0-10

## 2024-01-30 ASSESSMENT — PAIN SCALES - GENERAL: PAINLEVEL_OUTOF10: 0 - NO PAIN

## 2024-01-30 NOTE — PROGRESS NOTES
Physical Therapy    Physical Therapy Treatment    Patient Name: Lorraine Loja  MRN: [id number]  Today's Date: 1/30/2024  Time Calculation  Start Time: 1100  Stop Time: 1138  Time Calculation (min): 38 min  Insurance:  [place] Medicare Adv  $35 copay  0% coinsurance  PA req  Visit: 4  Total visits: 7 visits on treatment plan  Assessment:  PT Assessment  PT Assessment Results: Decreased strength, Decreased range of motion, Pain Pt reported minimal fatigue/muscle soreness following exercises but had no complaints of increased pain during or after treatment. Verbal and tactile cues required for correct performance of exercises. Pt would benefit from PT to continue to address impairments in order to improve strength, flexibility, posture, and body mechanics and to decrease symptoms and increase overall function.   Plan:  OP PT Plan  PT Plan: Skilled PTOP PT Plan   Contrinue to progress right shoulder strengthening as cassie     Treatment/Interventions: Aquatic therapy, Cryotherapy, Dry needling, Education/ Instruction, Electrical stimulation, Hot pack, Manual therapy, Neuromuscular re-education, Self care/ home management, Taping techniques, Therapeutic activities, Therapeutic exercises, Ultrasound, Vasopneumatic device  PT Plan: Skilled PT  PT Frequency: 1 time per week  Duration: 7 visits  Onset Date: 01/05/24  Certification Period Start Date: 01/10/24  Certification Period End Date: 04/09/24    Current Problem  1. Closed dislocation of right shoulder, subsequent encounter  Follow Up In Physical Therapy              General   Reason for Referral: R shoulder pain following dislocation  Referred By: Dr. Cole Budinsky  Past Medical History Relevant to Rehab: hx of HTN, UTI       Subjective    Pt states that her right shoulder is sore today with movement. No resting pain. She states that she gets occasional sharp pains in right lateral upper arm if she moves her arm a certain way. She has noticed that she is having an easier  time reaching behind her back to hook her bra.     Precautions  Precautions  STEADI Fall Risk Score (The score of 4 or more indicates an increased risk of falling): 7  Precautions Comment: hx of HTN, UTI      Pain  Pain Assessment  Pain Assessment: 0-10  Pain Score: 0 - No pain  Pain Type: Acute pain  Pain Location: Shoulder  Pain Orientation: Right    Objective        Verbal cues for correct performance of exercises and to avoid upper trap/levator scap substitution    Active right shoulder elevation 145            Treatments:       Therapeutic exercises:  Table slides flexion x10  Table slides abduction x10  Table ER x10  Pulleys 2 min  UBE fwd 2 min   Rows RTT x10  Shoulder extension RTT x10  Shoulder IR/ER step outs RTT x10 ea  Shoulder adduction RTT x10  Shoulder flexion/extension step outs x10 ea    Shoulder isometric flexion, IR/ER 5 sec x10 HEP   Supine boxes x10 ccw/cw  SL shoulder abd to 60 x10  SL shoulder ER x10      EDUCATION:  Instructed patient in isometric shoulder flexion, IR, ER for HEP    Goals:  By discharge:  1. Patient will report and demonstrate independence with established HEP  2. Patient will report a decrease in shoulder pain by 75% to improve ability to lift, reach, and dress without restrictions  3. Patient will demonstrate active shoulder flexion to 165, abduction to 145 deg, ER to C4, and IR to L1 to improve their ability to perform daily tasks such as dressing, bathing, and reaching for objects  4. Patient will demonstrate gross shoulder strength of >/= 4+/5 to increase their ability to perform all daily and work tasks  5. Patient will demonstrate a decrease in QuickDash score by 11 points to </=  11/55 to meet established MCID for the outcome measure (baseline 1/10/24 22/55)  6. Patient will report >/= 50% improvement in sleeping ability since starting PT including ability to fall asleep, stay asleep, as well as pain when waking to improve overall function   7. Patient will  demonstrate the ability to lift a 3lb weight onto a shelf above shoulder height 5x consecutively and without pain exceeding 2/10 to improve her ability to perform light tasks within the home  8. Patient will demonstrate the ability to perform active shoulder elevation against gravity >/= 140 deg while maintaining proper mechanics of the shoulder and without compensatory activation of the upper trapezius

## 2024-02-06 ENCOUNTER — TREATMENT (OUTPATIENT)
Dept: PHYSICAL THERAPY | Facility: CLINIC | Age: 84
End: 2024-02-06
Payer: MEDICARE

## 2024-02-06 DIAGNOSIS — S43.004D CLOSED DISLOCATION OF RIGHT SHOULDER, SUBSEQUENT ENCOUNTER: Primary | ICD-10-CM

## 2024-02-06 PROCEDURE — 97110 THERAPEUTIC EXERCISES: CPT | Mod: GP,CQ

## 2024-02-06 ASSESSMENT — PAIN - FUNCTIONAL ASSESSMENT: PAIN_FUNCTIONAL_ASSESSMENT: 0-10

## 2024-02-06 ASSESSMENT — PAIN SCALES - GENERAL: PAINLEVEL_OUTOF10: 0 - NO PAIN

## 2024-02-06 NOTE — PROGRESS NOTES
Physical Therapy    Physical Therapy Treatment    Patient Name: Lorraine Loja  MRN: [id number]  Today's Date: 2/6/2024  Time Calculation  Start Time: 1105  Stop Time: 1145  Time Calculation (min): 40 min  Insurance:  [place] Medicare Adv  $35 copay  0% coinsurance  PA req  Visit: 5  Total visits: 6   Assessment:  PT Assessment  PT Assessment Results: Decreased strength, Decreased range of motion, Pain Pt reported minimal fatigue/muscle soreness following exercises but had no complaints of increased pain during or after treatment. Verbal and tactile cues required for correct performance of exercises. Pt would benefit from PT to continue to address impairments in order to improve strength, flexibility, posture, and body mechanics and to decrease symptoms and increase overall function.   Plan:  OP PT Plan  PT Plan: Skilled PTOP PT Plan   Contrinue to progress right shoulder strengthening as cassie     Treatment/Interventions: Aquatic therapy, Cryotherapy, Dry needling, Education/ Instruction, Electrical stimulation, Hot pack, Manual therapy, Neuromuscular re-education, Self care/ home management, Taping techniques, Therapeutic activities, Therapeutic exercises, Ultrasound, Vasopneumatic device  PT Plan: Skilled PT  PT Frequency: 1 time per week  Duration: 7 visits  Onset Date: 01/05/24  Certification Period Start Date: 01/10/24  Certification Period End Date: 04/09/24    Current Problem  1. Closed dislocation of right shoulder, subsequent encounter  Follow Up In Physical Therapy                General   Reason for Referral: R shoulder pain following dislocation  Referred By: Dr. Cole Budinsky  Past Medical History Relevant to Rehab: hx of HTN, UTI       Subjective    Pt denies resting pain in right shoulder. She continues to report occasional sharp pains in right lateral upper arm if she moves her arm a certain way.     Precautions  Precautions  STEADI Fall Risk Score (The score of 4 or more indicates an increased risk  of falling): 7  Precautions Comment: hx of HTN, UTI      Pain  Pain Assessment  Pain Assessment: 0-10  Pain Score: 0 - No pain  Pain Type: Acute pain  Pain Location: Shoulder  Pain Orientation: Right    Objective        Verbal cues for correct performance of exercises and to avoid upper trap/levator scap substitution    PROM right shoulder ER = 90 degrees            Treatments:       Therapeutic exercises:  Table slides flexion x10 NT  Table slides abduction x10 NT  Table ER x10 NT  Pulleys flexion/scaption  2 min ea  UBE fwd 2 min   Rows RTT x15  Shoulder extension RTT x15  Shoulder IR/ER step outs RTT x15 ea  Shoulder adduction RTT x15  Shoulder flexion/extension step outs RTT x15 ea    Shoulder isometric flexion, IR/ER 5 sec x10 HEP   Wall alphabet x1  Shoulder flexion/scaption to 90 degrees x 10 ea   Supine boxes x10 ccw/cw  SL shoulder abd to 60 x10  SL shoulder ER x10      EDUCATION:  Instructed patient in isometric shoulder flexion, IR, ER for HEP    Goals:  By discharge:  1. Patient will report and demonstrate independence with established HEP  2. Patient will report a decrease in shoulder pain by 75% to improve ability to lift, reach, and dress without restrictions  3. Patient will demonstrate active shoulder flexion to 165, abduction to 145 deg, ER to C4, and IR to L1 to improve their ability to perform daily tasks such as dressing, bathing, and reaching for objects  4. Patient will demonstrate gross shoulder strength of >/= 4+/5 to increase their ability to perform all daily and work tasks  5. Patient will demonstrate a decrease in QuickDash score by 11 points to </=  11/55 to meet established MCID for the outcome measure (baseline 1/10/24 22/55)  6. Patient will report >/= 50% improvement in sleeping ability since starting PT including ability to fall asleep, stay asleep, as well as pain when waking to improve overall function   7. Patient will demonstrate the ability to lift a 3lb weight onto a shelf  above shoulder height 5x consecutively and without pain exceeding 2/10 to improve her ability to perform light tasks within the home  8. Patient will demonstrate the ability to perform active shoulder elevation against gravity >/= 140 deg while maintaining proper mechanics of the shoulder and without compensatory activation of the upper trapezius

## 2024-02-13 ENCOUNTER — TREATMENT (OUTPATIENT)
Dept: PHYSICAL THERAPY | Facility: CLINIC | Age: 84
End: 2024-02-13
Payer: MEDICARE

## 2024-02-13 DIAGNOSIS — S43.004D CLOSED DISLOCATION OF RIGHT SHOULDER, SUBSEQUENT ENCOUNTER: Primary | ICD-10-CM

## 2024-02-13 PROCEDURE — 97110 THERAPEUTIC EXERCISES: CPT | Mod: GP | Performed by: PHYSICAL THERAPIST

## 2024-02-13 ASSESSMENT — ENCOUNTER SYMPTOMS
LOSS OF SENSATION IN FEET: 0
DEPRESSION: 0
OCCASIONAL FEELINGS OF UNSTEADINESS: 0

## 2024-02-13 ASSESSMENT — PAIN - FUNCTIONAL ASSESSMENT: PAIN_FUNCTIONAL_ASSESSMENT: 0-10

## 2024-02-13 NOTE — PROGRESS NOTES
Physical Therapy    Physical Therapy Treatment    Patient Name: Lorraine Loja  MRN: [id number]  Today's Date: 2/13/2024  Time Calculation  Start Time: 1111  Stop Time: 1158  Time Calculation (min): 47 min  Insurance:  [place] Medicare [place]  $35 copay  0% coinsurance  PA req  Visit: 6  Total visits: 6   Assessment:  PT Assessment  PT Assessment Results: Decreased strength, Decreased range of motion, Pain  Rehab Prognosis: Good  Evaluation/Treatment Tolerance: Patient tolerated treatment well   Patient has completed 6 therapy visits up to this point, and have met 2/8 established therapy goals. The patient has progressed well, and has shown improvements in ROM, strength, pain intensity, and mechanics of the right shoulder. At this time, the patient remains below functional baseline with intermittent pain in the right shoulder, slight decrease in AROM of the right shoulder, weakness of the shoulder, and decreased posture. These deficits impair the patient's ability to sleep, dress, bathe, reach for objects, and perform all daily activities without restriction . They would benefit from continued skilled therapy at this time to continue to promote functional gains and a return to prior level of function. PT requesting and additional 4 visits at this time for a total of 10 visits.  Plan:  OP PT Plan  Treatment/Interventions: Aquatic therapy, Cryotherapy, Dry needling, Education/ Instruction, Electrical stimulation, Hot pack, Manual therapy, Mechanical traction, Neuromuscular re-education, Self care/ home management, Therapeutic activities, Therapeutic exercises, Taping techniques, Ultrasound, Vasopneumatic device  PT Plan: Skilled PT  PT Frequency: 1 time per week  Duration: 4 weeks  Onset Date: 01/05/24  Certification Period Start Date: 02/13/24  Certification Period End Date: 05/13/24  Number of Treatments Authorized: 5  Rehab Potential: Good  Plan of Care Agreement: Patient  Continue to progress strength and flexibility  of the R shoulders  Current Problem  1. Closed dislocation of right shoulder, subsequent encounter  Follow Up In Physical Therapy              General   Reason for Referral: R shoulder pain following dislocation  Referred By: Dr. Cole Budinsky  Past Medical History Relevant to Rehab: hx of HTN, UTI       Subjective    Patient reports that she has been dealing with some increased pain when she reaches behind he back and reaching across her body. She notes that pain in the shoulder when this happens can reach upwards of an 7-8/10. She feels approximately 85-90 better however in pain intensity. She reports she is no longer having issues sleeping, and is only getting woken up 1 night or so in the past week or two.     Precautions  Precautions  STEADI Fall Risk Score (The score of 4 or more indicates an increased risk of falling): 7  Precautions Comment: hx of HTN, UTI      Pain  Pain Assessment  Pain Assessment: 0-10    Objective   Shoulder AROM (*indicates pain): L from IE   Flexion R 164 L 171  Abd R 138, L 162  IR R L2*, L L1  ER R T1, L C4    Shoulder PROM (*indicates pain):  Flexion R 169, L NT  Abd R 161, L NT  IR R 62, L NT  ER R 81, L NT    Elevation against gravity: 128    Shoulder strength (*indicates pain):  Flexion R 4-/5, L 4+/5  Abd  R 4-/5, L 4+/5  IR  R 4/5, L 4+/5  ER R 4-/5*, L 4+/5    Quickdash: 17; 13.64%  Lifting: pt able to lift 3lb onto a shelf above shoulder height, but increased activation of the upper trapezius and pain 3/10    Treatments:  Therapeutic exercises:  Obj measures and goals review  Table slides flexion x10 NT  Table slides abduction x10 NT  Table ER x10 NT  Pulleys flexion 2 min  UBE fwd/bwd 1.5' ea   Rows RTT x15  Shoulder extension RTT x15  Shoulder IR/ER step outs RTT x15 ea  Shoulder adduction RTT x15  Shoulder flexion/extension step outs RTT x15 ea NT  Wall alphabet x1 NT  Shoulder flexion/scaption to 90 degrees x 10 ea NT  Supine boxes x10 ccw/cw  Supine serratus punches x12    SL shoulder abd to 60 x10 NT  SL shoulder ER x10  (44')    EDUCATION:  Instructed patient in isometric shoulder flexion, IR, ER for HEP  2/13/24:    Exercises  - Standing Shoulder Row with Anchored Resistance  - 1 x daily - 7 x weekly - 1 sets - 15 reps - 2 sec hold  - Shoulder extension with resistance - Neutral  - 1 x daily - 7 x weekly - 1 sets - 15 reps - 2 sec hold  - Shoulder External Rotation Reactive Isometrics  - 1 x daily - 7 x weekly - 1 sets - 15 reps - 2 sec hold  - Shoulder Internal Rotation Reactive Isometrics  - 1 x daily - 7 x weekly - 1 sets - 15 reps - 2 sec hold    Goals:  By discharge:  1. Patient will report and demonstrate independence with established HEP ONGOING  2. Patient will report a decrease in shoulder pain by 75% to improve ability to lift, reach, and dress without restrictions MET  3. Patient will demonstrate active shoulder flexion to 165, abduction to 145 deg, ER to C4, and IR to L1 to improve their ability to perform daily tasks such as dressing, bathing, and reaching for objects PROGRESSING  4. Patient will demonstrate gross shoulder strength of >/= 4+/5 to increase their ability to perform all daily and work tasks PROGRESSING  5. Patient will demonstrate a decrease in QuickDash score by 11 points to </=  11/55 to meet established MCID for the outcome measure (baseline 1/10/24 22/55) PROGRESSING   6. Patient will report >/= 50% improvement in sleeping ability since starting PT including ability to fall asleep, stay asleep, as well as pain when waking to improve overall function MET  7. Patient will demonstrate the ability to lift a 3lb weight onto a shelf above shoulder height 5x consecutively and without pain exceeding 2/10 to improve her ability to perform light tasks within the home PROGRESSING   8. Patient will demonstrate the ability to perform active shoulder elevation against gravity >/= 140 deg while maintaining proper mechanics of the shoulder and without compensatory  activation of the upper trapezius PROGRESSING

## 2024-02-20 ENCOUNTER — TREATMENT (OUTPATIENT)
Dept: PHYSICAL THERAPY | Facility: CLINIC | Age: 84
End: 2024-02-20
Payer: MEDICARE

## 2024-02-20 DIAGNOSIS — S43.004D CLOSED DISLOCATION OF RIGHT SHOULDER, SUBSEQUENT ENCOUNTER: Primary | ICD-10-CM

## 2024-02-20 PROCEDURE — 97140 MANUAL THERAPY 1/> REGIONS: CPT | Mod: GP | Performed by: PHYSICAL THERAPIST

## 2024-02-20 PROCEDURE — 97110 THERAPEUTIC EXERCISES: CPT | Mod: GP | Performed by: PHYSICAL THERAPIST

## 2024-02-20 ASSESSMENT — PAIN SCALES - GENERAL: PAINLEVEL_OUTOF10: 6

## 2024-02-20 ASSESSMENT — PAIN - FUNCTIONAL ASSESSMENT: PAIN_FUNCTIONAL_ASSESSMENT: 0-10

## 2024-02-20 NOTE — PROGRESS NOTES
"Physical Therapy    Physical Therapy Treatment    Patient Name: Lorraine Loja  MRN: [id number]  Today's Date: 2/20/2024  Time Calculation  Start Time: 1105  Stop Time: 1144  Time Calculation (min): 39 min  Insurance:  [place] Medicare Adv  $35 copay  0% coinsurance  PA req  Visit: 6  Total visits: 11  5 additional visits approved   2/20/24-4/19/24  TA, TE, manual, estim, US approved     Assessment:  PT introduces soft tissue work to the anterior shoulder at the start of session due to reports of increased pain in that area prior to session. Patient reports mild decrease in pain following manual therapy, but continued difficulty with extension of the arm/shoulder past neutral. Therapeutic exercises target improving flexibility, stability, and strength of the right shoulder within pain tolerance. Patient notes mild increase in soreness of the shoulder following treatment session. PT instructs patient on ice use following activity to manage pain/inflammation. She remains below baseline function at this time, and requires additional skilled PT.   PT Assessment  PT Assessment Results: Decreased strength, Decreased range of motion, Pain  Rehab Prognosis: Good     Plan:  OP PT Plan  PT Plan: Skilled PT  Rehab Potential: Good  Plan of Care Agreement: Patient  Continue to progress strength and flexibility of the R shoulders    Current Problem  1. Closed dislocation of right shoulder, subsequent encounter            General     General  Reason for Referral: R shoulder pain following dislocation  Referred By: Dr. Cole Budinsky  Past Medical History Relevant to Rehab: hx of HTN, UTI    Subjective    Patient reports that her shoulder pain is slightly worse at this time, but she is unsure why. She notes that when she moves it a certain way she gets a \"catch\" in the shoulder but not every time. Pain with activity is usually only a 3/10.     Precautions  Precautions  STEADI Fall Risk Score (The score of 4 or more indicates an " increased risk of falling): 7  Precautions Comment: hx of HTN, UTI     Pain  Pain Assessment  Pain Assessment: 0-10  Pain Score: 6  Pain Type: Acute pain  Pain Location: Shoulder  Pain Orientation: Right    Objective   TTP to the anterior deltoid and bicipital groove on the R  VC to maintain proper shoulder position in neutral during step outs     Treatments:  Therapeutic exercises:  Pulleys flexion 2 min  UBE fwd/bwd 1.5' ea   Rows RTT x15  Shoulder extension RTT x15  Shoulder IR/ER step outs RTT x15 ea  Shoulder adduction RTT x15  Shoulder flexion/extension step outs RTT x15 ea NT  Wall alphabet x1 NT  Shoulder flexion/scaption to 90 degrees x 10 ea  Supine boxes x10 ccw/cw 1#  Supine serratus punches x12   SL shoulder abd to 60 x10 NT  SL shoulder ER x10  (29')    Manual therapy:   STM/TPR to anterior deltoid, proximal bicep, and pectoralis major  (10')    EDUCATION:  Instructed patient in isometric shoulder flexion, IR, ER for HEP  2/13/24:    Exercises  - Standing Shoulder Row with Anchored Resistance  - 1 x daily - 7 x weekly - 1 sets - 15 reps - 2 sec hold  - Shoulder extension with resistance - Neutral  - 1 x daily - 7 x weekly - 1 sets - 15 reps - 2 sec hold  - Shoulder External Rotation Reactive Isometrics  - 1 x daily - 7 x weekly - 1 sets - 15 reps - 2 sec hold  - Shoulder Internal Rotation Reactive Isometrics  - 1 x daily - 7 x weekly - 1 sets - 15 reps - 2 sec hold    Goals:  By discharge:  1. Patient will report and demonstrate independence with established HEP ONGOING  2. Patient will report a decrease in shoulder pain by 75% to improve ability to lift, reach, and dress without restrictions MET  3. Patient will demonstrate active shoulder flexion to 165, abduction to 145 deg, ER to C4, and IR to L1 to improve their ability to perform daily tasks such as dressing, bathing, and reaching for objects PROGRESSING  4. Patient will demonstrate gross shoulder strength of >/= 4+/5 to increase their ability to  perform all daily and work tasks PROGRESSING  5. Patient will demonstrate a decrease in QuickDash score by 11 points to </=  11/55 to meet established MCID for the outcome measure (baseline 1/10/24 22/55) PROGRESSING   6. Patient will report >/= 50% improvement in sleeping ability since starting PT including ability to fall asleep, stay asleep, as well as pain when waking to improve overall function MET  7. Patient will demonstrate the ability to lift a 3lb weight onto a shelf above shoulder height 5x consecutively and without pain exceeding 2/10 to improve her ability to perform light tasks within the home PROGRESSING   8. Patient will demonstrate the ability to perform active shoulder elevation against gravity >/= 140 deg while maintaining proper mechanics of the shoulder and without compensatory activation of the upper trapezius PROGRESSING

## 2024-02-21 ENCOUNTER — OFFICE VISIT (OUTPATIENT)
Dept: ORTHOPEDIC SURGERY | Facility: CLINIC | Age: 84
End: 2024-02-21
Payer: MEDICARE

## 2024-02-21 DIAGNOSIS — S43.004S SHOULDER DISLOCATION, RIGHT, SEQUELA: ICD-10-CM

## 2024-02-21 PROCEDURE — 1160F RVW MEDS BY RX/DR IN RCRD: CPT | Performed by: FAMILY MEDICINE

## 2024-02-21 PROCEDURE — 1125F AMNT PAIN NOTED PAIN PRSNT: CPT | Performed by: FAMILY MEDICINE

## 2024-02-21 PROCEDURE — 99213 OFFICE O/P EST LOW 20 MIN: CPT | Performed by: FAMILY MEDICINE

## 2024-02-21 PROCEDURE — 1159F MED LIST DOCD IN RCRD: CPT | Performed by: FAMILY MEDICINE

## 2024-02-21 PROCEDURE — 1036F TOBACCO NON-USER: CPT | Performed by: FAMILY MEDICINE

## 2024-02-21 RX ORDER — DICLOFENAC SODIUM 10 MG/G
GEL TOPICAL
Qty: 150 G | Refills: 2 | Status: SHIPPED | OUTPATIENT
Start: 2024-02-21

## 2024-02-21 NOTE — PROGRESS NOTES
Established Patient Follow-Up Visit    CC:   Chief Complaint   Patient presents with    Right Shoulder - Follow-up     Dislocation  Re evaluate today       DOI: 12/8/23       HPI:  Lorraine is a 83 y.o. female returns here today for follow-up visit regarding: Right shoulder pain status post dislocation here for follow-up.  She states overall she is doing well has improved range of motion had a very nice physical therapy visit the other day.  She states she still has 4-5 more sessions left over the next month.  She denies any numbness tingling or burning.          REVIEW OF SYSTEMS:  GENERAL: Negative for malaise, significant weight loss, fever  MUSCULOSKELETAL: See HPI  NEURO: Negative for numbness / tingling       PHYSICAL EXAM:  -Neuro: Gross sensation intact to the upper extremities bilaterally.  -Extremity: Right shoulder demonstrates full forward flexion equal symmetric bilaterally lateral abduction is limited to about 85 degrees.  She just lacks the ability to fully get horizontal.  She has an equivocal Neer's [place] and [place] she has soft tissue tenderness over the subacromial bursa laterally.  Biceps and triceps are nontender and fully intact no obvious crepitus about the shoulder.    IMAGING: No new imaging today      PROCEDURE: None  Procedures     ASSESSMENT:   Follow-up visit for:  Problem List Items Addressed This Visit    None  Visit Diagnoses       Shoulder dislocation, right, sequela        Relevant Medications    diclofenac sodium (Voltaren) 1 % gel             PLAN: At this time we will have the patient follow-up in 4 weeks if she still has lateral sided shoulder pain we will consider soft tissue injection versus intra-articular injection.  Recommended that she continue with icing and will offer her Voltaren gel prescription that she can massage into the shoulder over the next several weeks.  Consider injection going forward at follow-up.  Orders Placed This Encounter    diclofenac sodium  (Voltaren) 1 % gel           At the conclusion of the visit there were no further questions by the patient/family regarding their plan of care.  Patient was instructed to call or return with any issues, questions, or concerns regarding their injury and/or treatment plan described above.     02/21/24 at 11:16 AM - Cole C Budinsky, MD    Office: [phone]    This note was prepared using voice recognition software.  The details of this note are correct and have been reviewed, and corrected to the best of my ability.  Some grammatical errors may persist related to the Dragon software.

## 2024-02-27 ENCOUNTER — TREATMENT (OUTPATIENT)
Dept: PHYSICAL THERAPY | Facility: CLINIC | Age: 84
End: 2024-02-27
Payer: MEDICARE

## 2024-02-27 DIAGNOSIS — S43.004D CLOSED DISLOCATION OF RIGHT SHOULDER, SUBSEQUENT ENCOUNTER: Primary | ICD-10-CM

## 2024-02-27 PROCEDURE — 97140 MANUAL THERAPY 1/> REGIONS: CPT | Mod: GP,CQ

## 2024-02-27 ASSESSMENT — PAIN SCALES - GENERAL: PAINLEVEL_OUTOF10: 6

## 2024-02-27 ASSESSMENT — PAIN - FUNCTIONAL ASSESSMENT: PAIN_FUNCTIONAL_ASSESSMENT: 0-10

## 2024-02-27 NOTE — PROGRESS NOTES
"Physical Therapy    Physical Therapy Treatment    Patient Name: Lorraine Loja  MRN: [id number]  Today's Date: 2/27/2024  Time Calculation  Start Time: 1105  Stop Time: 1135  Time Calculation (min): 30 min  Insurance:  [place] Medicare Adv  $35 copay  0% coinsurance  PA req  Visit: 7  Total visits: 11  5 additional visits approved   2/20/24-4/19/24  TA, TE, manual, estim, US approved     Assessment:  Held exercises today due to increased right shoulder pain. Focused on manual therapy to decrease tightness/trigger points in right anterior shoulder. Pt reported that right shoulder felt \"looser\" following manual therapy. No complaints of resting pain at end of session. Pt to try gentle HEP later today. Instructed her to hold any exercise that increases pain. Pt would benefit from PT to continue to improve strength, flexibility, posture, and body mechanics and to decrease symptoms and increase overall function.   PT Assessment  PT Assessment Results: Decreased strength, Decreased range of motion, Pain     Plan:  OP PT Plan  PT Plan: Skilled PT  Continue to progress strength and flexibility of the R shoulders. Assess pain relief following manual therapy at next visit.     Current Problem  1. Closed dislocation of right shoulder, subsequent encounter              General     General  Reason for Referral: R shoulder pain following dislocation  Past Medical History Relevant to Rehab: hx of HTN, UTI    Subjective    Pt states that her right anterior shoulder is very sore today. No significant resting pain/ just painful with certain movements. She states that it felt a little better after manual therapy last visit but the pain returned. She states that she saw MD and he prescribed Voltaren gel and she has been using that but has not really noticed a significant difference yet.     Precautions  Precautions  STEADI Fall Risk Score (The score of 4 or more indicates an increased risk of falling): 7  Precautions Comment: hx of " HTN, UTI     Pain  Pain Assessment  Pain Assessment: 0-10  Pain Score: 6  Pain Location: Shoulder  Pain Orientation: Right    Objective     Tightness noted in right anterior deltoid, proximal bicep and pec major    Forward shoulder posture noted on right    Treatments:  Held therapeutic exercises today due to increased pain in anterior right shoulder     Tightness noted in right anterior deltoid, proximal bicep and pec major    Forward shoulder posture noted on right      Manual therapy:   PROM right shoulder flexion/ER. STM/TPR to anterior deltoid, proximal bicep, and pectoralis major. Gentle manual pec minor stretch  (30')    EDUCATION:  Instructed patient in isometric shoulder flexion, IR, ER for HEP  2/13/24:    Exercises  - Standing Shoulder Row with Anchored Resistance  - 1 x daily - 7 x weekly - 1 sets - 15 reps - 2 sec hold  - Shoulder extension with resistance - Neutral  - 1 x daily - 7 x weekly - 1 sets - 15 reps - 2 sec hold  - Shoulder External Rotation Reactive Isometrics  - 1 x daily - 7 x weekly - 1 sets - 15 reps - 2 sec hold  - Shoulder Internal Rotation Reactive Isometrics  - 1 x daily - 7 x weekly - 1 sets - 15 reps - 2 sec hold    Goals:  By discharge:  1. Patient will report and demonstrate independence with established HEP  2. Patient will report a decrease in shoulder pain by 75% to improve ability to lift, reach, and dress without restrictions   3. Patient will demonstrate active shoulder flexion to 165, abduction to 145 deg, ER to C4, and IR to L1 to improve their ability to perform daily tasks such as dressing, bathing, and reaching for objects   4. Patient will demonstrate gross shoulder strength of >/= 4+/5 to increase their ability to perform all daily and work tasks   5. Patient will demonstrate a decrease in QuickDash score by 11 points to </=  11/55 to meet established MCID for the outcome measure (baseline 1/10/24 22/55)    6. Patient will report >/= 50% improvement in sleeping  ability since starting PT including ability to fall asleep, stay asleep, as well as pain when waking to improve overall function  7. Patient will demonstrate the ability to lift a 3lb weight onto a shelf above shoulder height 5x consecutively and without pain exceeding 2/10 to improve her ability to perform light tasks within the home    8. Patient will demonstrate the ability to perform active shoulder elevation against gravity >/= 140 deg while maintaining proper mechanics of the shoulder and without compensatory activation of the upper trapezius

## 2024-03-05 ENCOUNTER — TREATMENT (OUTPATIENT)
Dept: PHYSICAL THERAPY | Facility: CLINIC | Age: 84
End: 2024-03-05
Payer: MEDICARE

## 2024-03-05 DIAGNOSIS — S43.004D CLOSED DISLOCATION OF RIGHT SHOULDER, SUBSEQUENT ENCOUNTER: Primary | ICD-10-CM

## 2024-03-05 PROCEDURE — 97110 THERAPEUTIC EXERCISES: CPT | Mod: GP,CQ

## 2024-03-05 PROCEDURE — 97140 MANUAL THERAPY 1/> REGIONS: CPT | Mod: GP,CQ

## 2024-03-05 ASSESSMENT — PAIN - FUNCTIONAL ASSESSMENT: PAIN_FUNCTIONAL_ASSESSMENT: 0-10

## 2024-03-05 ASSESSMENT — PAIN SCALES - GENERAL: PAINLEVEL_OUTOF10: 0 - NO PAIN

## 2024-03-05 NOTE — PROGRESS NOTES
Physical Therapy    Physical Therapy Treatment    Patient Name: Lorraine Loja  MRN: [id number]  Today's Date: 3/5/2024  Time Calculation  Start Time: 1108  Stop Time: 1150  Time Calculation (min): 42 min  Insurance:  [place] Medicare Adv  $35 copay  0% coinsurance  PA req  Visit: 8  Total visits: 11  5 additional visits approved   2/20/24-4/19/24  TA, TE, manual, estim, US approved     Assessment:  Pt reported minimal fatigue/muscle soreness in right shoulder after exercises but no complaints of increased pain. Pt would benefit from PT to continue to improve strength, flexibility, posture, and body mechanics and to decrease symptoms and increase overall function.   PT Assessment  PT Assessment Results: Decreased strength, Decreased range of motion, Pain     Plan:  OP PT Plan  PT Plan: Skilled PT  Continue to progress strength and flexibility of the R shoulder.     Current Problem  1. Closed dislocation of right shoulder, subsequent encounter                General     General  Reason for Referral: R shoulder pain following dislocation  Referred By: Dr. Cole Budinsky  Past Medical History Relevant to Rehab: hx of HTN, UTI    Subjective    Pt denies current right shoulder pain. She states that pain has been a little better since last visit. She states that she has been trying to be more cautious about avoiding quicker movements and this seems to have helped. She states that Voltaren has been helping a little to soothe the area as well.     Precautions  Precautions  STEADI Fall Risk Score (The score of 4 or more indicates an increased risk of falling): 7  Precautions Comment: hx of HTN, UTI     Pain  Pain Assessment  Pain Assessment: 0-10  Pain Score: 0 - No pain  Pain Location: Shoulder  Pain Orientation: Right    Objective   Minimal tightness in right anterior deltoid/proximal bicep and pec major but decreased compared to last visit      Treatments:    Therapeutic exercises:  Pulleys flexion 2 min  UBE fwd/bwd 1.5'  ea   Rows RTT x15  Shoulder extension RTT x15  Shoulder IR/ER step outs RTT x15 ea  Shoulder adduction RTT x15  Shoulder flexion/extension step outs RTT x15 ea NT  Wall alphabet x1 NT  Shoulder flexion/scaption to 90 degrees x 10 ea  Supine boxes x10 ccw/cw   Supine serratus punches x10   SL shoulder abd to 60 x10 NT  SL shoulder ER x10  (32')          Manual therapy:   PROM right shoulder flexion/ER. STM/TPR to anterior deltoid, proximal bicep, and pectoralis major. Gentle manual pec minor stretch  (10')    EDUCATION:  Instructed patient in isometric shoulder flexion, IR, ER for HEP  2/13/24:    Exercises  - Standing Shoulder Row with Anchored Resistance  - 1 x daily - 7 x weekly - 1 sets - 15 reps - 2 sec hold  - Shoulder extension with resistance - Neutral  - 1 x daily - 7 x weekly - 1 sets - 15 reps - 2 sec hold  - Shoulder External Rotation Reactive Isometrics  - 1 x daily - 7 x weekly - 1 sets - 15 reps - 2 sec hold  - Shoulder Internal Rotation Reactive Isometrics  - 1 x daily - 7 x weekly - 1 sets - 15 reps - 2 sec hold    Goals:  By discharge:  1. Patient will report and demonstrate independence with established HEP  2. Patient will report a decrease in shoulder pain by 75% to improve ability to lift, reach, and dress without restrictions   3. Patient will demonstrate active shoulder flexion to 165, abduction to 145 deg, ER to C4, and IR to L1 to improve their ability to perform daily tasks such as dressing, bathing, and reaching for objects   4. Patient will demonstrate gross shoulder strength of >/= 4+/5 to increase their ability to perform all daily and work tasks   5. Patient will demonstrate a decrease in QuickDash score by 11 points to </=  11/55 to meet established MCID for the outcome measure (baseline 1/10/24 22/55)    6. Patient will report >/= 50% improvement in sleeping ability since starting PT including ability to fall asleep, stay asleep, as well as pain when waking to improve overall  function  7. Patient will demonstrate the ability to lift a 3lb weight onto a shelf above shoulder height 5x consecutively and without pain exceeding 2/10 to improve her ability to perform light tasks within the home    8. Patient will demonstrate the ability to perform active shoulder elevation against gravity >/= 140 deg while maintaining proper mechanics of the shoulder and without compensatory activation of the upper trapezius

## 2024-03-12 ENCOUNTER — TREATMENT (OUTPATIENT)
Dept: PHYSICAL THERAPY | Facility: CLINIC | Age: 84
End: 2024-03-12
Payer: MEDICARE

## 2024-03-12 DIAGNOSIS — S43.004D CLOSED DISLOCATION OF RIGHT SHOULDER, SUBSEQUENT ENCOUNTER: Primary | ICD-10-CM

## 2024-03-12 PROCEDURE — 97110 THERAPEUTIC EXERCISES: CPT | Mod: GP | Performed by: PHYSICAL THERAPIST

## 2024-03-12 PROCEDURE — 97110 THERAPEUTIC EXERCISES: CPT | Mod: GP,CQ

## 2024-03-12 PROCEDURE — 97140 MANUAL THERAPY 1/> REGIONS: CPT | Mod: GP | Performed by: PHYSICAL THERAPIST

## 2024-03-12 ASSESSMENT — PAIN - FUNCTIONAL ASSESSMENT: PAIN_FUNCTIONAL_ASSESSMENT: 0-10

## 2024-03-12 ASSESSMENT — PAIN SCALES - GENERAL: PAINLEVEL_OUTOF10: 7

## 2024-03-12 NOTE — PROGRESS NOTES
Physical Therapy    Physical Therapy Treatment    Patient Name: Lorraine Loja  MRN: [id number]  Today's Date: 3/12/2024  Time Calculation  Start Time: 1100  Stop Time: 1140  Time Calculation (min): 40 min  Insurance:  [place] Medicare Adv  $35 copay  0% coinsurance  PA req  Visit: 9  Total visits: 11  5 additional visits approved   2/20/24-4/19/24  TA, TE, manual, estim, US approved     Assessment:  Pt reported minimal fatigue/muscle soreness in right shoulder after exercises but no complaints of increased pain. She reported 0/10 resting pain in right shoulder at end of session. Pt would benefit from PT to continue to improve strength, flexibility, posture, and body mechanics and to decrease symptoms and increase overall function.   PT Assessment  PT Assessment Results: Decreased strength, Decreased range of motion, Pain     Plan:  OP PT Plan  PT Plan: Skilled PT  Continue to progress strength and flexibility of the R shoulder.     Current Problem  1. Closed dislocation of right shoulder, subsequent encounter                  General     General  Reason for Referral: R shoulder pain following dislocation  Referred By: Dr. Cole Budinsky  Past Medical History Relevant to Rehab: hx of HTN, UTI    Subjective    Pt states that her right shoulder felt really good yesterday even with activity but is bothering her more today. She states that she went grocery shopping yesterday and did lift a case of pop but did not notice any increased pain at the time. She denies any other new activity. No resting pain currently, just 7/10 pain with certain movements.      Precautions  Precautions  STEADI Fall Risk Score (The score of 4 or more indicates an increased risk of falling): 7  Precautions Comment: hx of HTN, UTI     Pain  Pain Assessment  Pain Assessment: 0-10  Pain Score: 7  Pain Location: Shoulder  Pain Orientation: Right    Objective   Minimal tightness in right anterior deltoid/proximal bicep and pec major         Treatments:    Therapeutic exercises:  Pulleys flexion 2 min  UBE fwd/bwd 1.5' ea   Rows RTT x15  Shoulder extension RTT x15  Shoulder IR/ER step outs RTT x15 ea  Shoulder adduction RTT x15  Shoulder flexion/extension step outs RTT x15 ea NT  Wall alphabet x1 NT  Shoulder flexion/scaption to 90 degrees x 10 ea  Supine boxes x15 ccw/cw   Supine serratus punches x10   SL shoulder abd to 60 x10   SL shoulder ER x10  (30')          Manual therapy:   PROM right shoulder flexion/ER. STM/TPR to anterior deltoid, proximal bicep, and pectoralis major. Gentle manual pec minor stretch  (10')    EDUCATION:  Instructed patient in isometric shoulder flexion, IR, ER for HEP  2/13/24:    Exercises  - Standing Shoulder Row with Anchored Resistance  - 1 x daily - 7 x weekly - 1 sets - 15 reps - 2 sec hold  - Shoulder extension with resistance - Neutral  - 1 x daily - 7 x weekly - 1 sets - 15 reps - 2 sec hold  - Shoulder External Rotation Reactive Isometrics  - 1 x daily - 7 x weekly - 1 sets - 15 reps - 2 sec hold  - Shoulder Internal Rotation Reactive Isometrics  - 1 x daily - 7 x weekly - 1 sets - 15 reps - 2 sec hold    Goals:  By discharge:  1. Patient will report and demonstrate independence with established HEP  2. Patient will report a decrease in shoulder pain by 75% to improve ability to lift, reach, and dress without restrictions   3. Patient will demonstrate active shoulder flexion to 165, abduction to 145 deg, ER to C4, and IR to L1 to improve their ability to perform daily tasks such as dressing, bathing, and reaching for objects   4. Patient will demonstrate gross shoulder strength of >/= 4+/5 to increase their ability to perform all daily and work tasks   5. Patient will demonstrate a decrease in QuickDash score by 11 points to </=  11/55 to meet established MCID for the outcome measure (baseline 1/10/24 22/55)    6. Patient will report >/= 50% improvement in sleeping ability since starting PT including ability  to fall asleep, stay asleep, as well as pain when waking to improve overall function  7. Patient will demonstrate the ability to lift a 3lb weight onto a shelf above shoulder height 5x consecutively and without pain exceeding 2/10 to improve her ability to perform light tasks within the home    8. Patient will demonstrate the ability to perform active shoulder elevation against gravity >/= 140 deg while maintaining proper mechanics of the shoulder and without compensatory activation of the upper trapezius

## 2024-03-19 ENCOUNTER — TREATMENT (OUTPATIENT)
Dept: PHYSICAL THERAPY | Facility: CLINIC | Age: 84
End: 2024-03-19
Payer: MEDICARE

## 2024-03-19 DIAGNOSIS — S43.004D CLOSED DISLOCATION OF RIGHT SHOULDER, SUBSEQUENT ENCOUNTER: Primary | ICD-10-CM

## 2024-03-19 PROCEDURE — 97110 THERAPEUTIC EXERCISES: CPT | Mod: GP | Performed by: PHYSICAL THERAPIST

## 2024-03-19 ASSESSMENT — PAIN SCALES - GENERAL: PAINLEVEL_OUTOF10: 2

## 2024-03-19 ASSESSMENT — PAIN - FUNCTIONAL ASSESSMENT: PAIN_FUNCTIONAL_ASSESSMENT: 0-10

## 2024-03-19 NOTE — PROGRESS NOTES
Physical Therapy    Physical Therapy Discharge    Patient Name: Lorraine Loja  MRN: [id number]  Today's Date: 3/19/2024  Time Calculation  Start Time: 1105  Stop Time: 1150  Time Calculation (min): 45 min  Insurance:  [place] Medicare Adv  $35 copay  0% coinsurance  PA req  Visit: 11  Total visits: 11  5 additional visits approved   24-24  TA, TE, manual, estim, US approved     Assessment:  Patient has completed 11 therapy visits up to this point, and have met 6/8 established therapy goals. The patient has progressed well, and has shown improvements in pain intensity, strength, flexibility, postural control, and overall function of the shoulder. At this time, the patient remains below functional baseline with intermittent pain in the shoulder, mild decrease in ROM, and mild weakness of the shoulder. PT anticipates additional progression in these areas with continuation of HEP. Patient is discharged from formal physical therapy at this time, with instructions to continue with HEP, and follow up with physician should their status change.  PT Assessment  PT Assessment Results: Decreased strength, Decreased range of motion, Pain  Rehab Prognosis: Excellent  Evaluation/Treatment Tolerance: Patient tolerated treatment well     Plan:  OP PT Plan  Treatment/Interventions: Cryotherapy, Dry needling, Education/ Instruction, Electrical stimulation, Hot pack, Manual therapy, Neuromuscular re-education, Self care/ home management, Taping techniques, Therapeutic activities, Therapeutic exercises, Ultrasound, Vasopneumatic device  PT Plan: Skilled PT  PT Frequency: 1 time per week  Duration: 1 visit (current visit)  Onset Date: 24  Certification Period Start Date: 24  Certification Period End Date: 24  Number of Treatments Authorized: 1  Rehab Potential: Excellent  Plan of Care Agreement: Patient      POC  last date due to visit tracking issue from authorized visits from insurance vs. Visits  placed on POC. Current visit authorized by insurance, and POC updated this date and will be sent for certification to referring physician to cover the patient's current visit. However, patient will be discharged from formal PT following her visit this date due to good progress towards established PT goals.   Current Problem  1. Closed dislocation of right shoulder, subsequent encounter              General     General  Reason for Referral: R shoulder pain following dislocation  Referred By: Dr. Cole Budinsky  Past Medical History Relevant to Rehab: hx of HTN, UTI    Subjective    Patient reports that the shoulder isn't feeling too bad at this point. She reports that she is still getting some pain when she moves it a certain way, but notes that she is more cautious of it and aware of it. Overall she notes approximately 85% improvement in her shoulder pain since her initial injury. 100% improvement in sleep since start of session.     Precautions  Precautions  STEADI Fall Risk Score (The score of 4 or more indicates an increased risk of falling): 7  Precautions Comment: hx of HTN, UTI     Pain  Pain Assessment  Pain Assessment: 0-10  Pain Score: 2  Pain Location: Shoulder  Pain Orientation: Right    Objective   Shoulder AROM (*indicates pain): L from IE   Flexion R 169 L 171  Abd R 147, L 162  IR R T12, L L1  ER R T1, L C4    Elevation against gravity: 148    Shoulder strength (*indicates pain): L from IE   Flexion R 4/5, L 4+/5  Abd  R 4/5, L 4+/5  IR  R 4/5, L 4+/5  ER R 4/5, L 4+/5    Quickdash: 13; 14.55%    Lifting: pt able to lift 3lb onto a shelf above shoulder height with good mechanics of the shoulder and no increase in pain      Treatments:    Therapeutic exercises:  Obj measures and goals review  Pulleys flexion 2 min  UBE fwd/bwd 2' ea   Rows GTT x15  Shoulder extension RTT x15  Shoulder IR/ER step outs RTT x15 ea  Shoulder adduction RTT x15  Shoulder flexion/extension step outs RTT x15 ea NT  Wall  alphabet x1 NT  Shoulder flexion/scaption/abduction to 90 degrees x 10 ea  Supine boxes x15 ccw/cw   Supine serratus punches x10   SL shoulder abd to 60 x10   SL shoulder ER x10 NT  (40')    EDUCATION:  Instructed patient in isometric shoulder flexion, IR, ER for HEP  2/13/24:    Exercises  - Standing Shoulder Row with Anchored Resistance  - 1 x daily - 7 x weekly - 1 sets - 15 reps - 2 sec hold  - Shoulder extension with resistance - Neutral  - 1 x daily - 7 x weekly - 1 sets - 15 reps - 2 sec hold  - Shoulder External Rotation Reactive Isometrics  - 1 x daily - 7 x weekly - 1 sets - 15 reps - 2 sec hold  - Shoulder Internal Rotation Reactive Isometrics  - 1 x daily - 7 x weekly - 1 sets - 15 reps - 2 sec hold    Goals:  By discharge:  1. Patient will report and demonstrate independence with established HEP MET  2. Patient will report a decrease in shoulder pain by 75% to improve ability to lift, reach, and dress without restrictions MET  3. Patient will demonstrate active shoulder flexion to 165, abduction to 145 deg, ER to C4, and IR to L1 to improve their ability to perform daily tasks such as dressing, bathing, and reaching for objects MET  4. Patient will demonstrate gross shoulder strength of >/= 4+/5 to increase their ability to perform all daily and work tasks PARTIALLY MET  5. Patient will demonstrate a decrease in QuickDash score by 11 points to </=  11/55 to meet established MCID for the outcome measure (baseline 1/10/24 22/55)  PARTIALLY MET  6. Patient will report >/= 50% improvement in sleeping ability since starting PT including ability to fall asleep, stay asleep, as well as pain when waking to improve overall function MET  7. Patient will demonstrate the ability to lift a 3lb weight onto a shelf above shoulder height 5x consecutively and without pain exceeding 2/10 to improve her ability to perform light tasks within the home  MET  8. Patient will demonstrate the ability to perform active shoulder  elevation against gravity >/= 140 deg while maintaining proper mechanics of the shoulder and without compensatory activation of the upper trapezius MET

## 2024-03-20 ENCOUNTER — OFFICE VISIT (OUTPATIENT)
Dept: ORTHOPEDIC SURGERY | Facility: CLINIC | Age: 84
End: 2024-03-20
Payer: MEDICARE

## 2024-03-20 DIAGNOSIS — S43.004S SHOULDER DISLOCATION, RIGHT, SEQUELA: Primary | ICD-10-CM

## 2024-03-20 PROCEDURE — 99213 OFFICE O/P EST LOW 20 MIN: CPT | Performed by: FAMILY MEDICINE

## 2024-03-20 PROCEDURE — 1036F TOBACCO NON-USER: CPT | Performed by: FAMILY MEDICINE

## 2024-03-20 PROCEDURE — 1160F RVW MEDS BY RX/DR IN RCRD: CPT | Performed by: FAMILY MEDICINE

## 2024-03-20 PROCEDURE — 1159F MED LIST DOCD IN RCRD: CPT | Performed by: FAMILY MEDICINE

## 2024-03-20 NOTE — PROGRESS NOTES
Established Patient Follow-Up Visit    CC:   Chief Complaint   Patient presents with    Right Shoulder - Follow-up     Shoulder dislocation  Possible injection today       HPI:  Lorraine is a 83 y.o. female returns here today for follow-up visit regarding: Right shoulder dislocation.  She returns here today for follow-up.  She tolerated physical therapy she has been discharged.  She states a very small amount of pain and discomfort intermittently with very specific motions and movements.  She does not feel that this pain here today warrants an injection.  She states that the pain and discomfort has been about the same level since the day she injured the shoulder.          REVIEW OF SYSTEMS:  GENERAL: Negative for malaise, significant weight loss, fever  MUSCULOSKELETAL: See HPI  NEURO: Negative for numbness / tingling       PHYSICAL EXAM:  -Neuro: Gross sensation intact to the upper extremities bilaterally.  -Extremity: Right shoulder demonstrates excellent range of motion with forward flexion 145 degrees lateral abduction to 90 normal internal/external rotation here today.  4-5 strength with resisted abduction and supraspinatus testing.   strength equal symmetric and intact she does have tenderness palpation over the bursa and a little bit over the proximal aspect of the biceps tendon.  No significant pain at the AC joint.  No swelling about the shoulder.  Elbow is nontender.  Normal pronation supination with a negative Speed and Yergason's Test.    IMAGING: No new imaging today      PROCEDURE: None  Procedures     ASSESSMENT:   Follow-up visit for:  Problem List Items Addressed This Visit    None  Visit Diagnoses       Shoulder dislocation, right, sequela    -  Primary             PLAN: Lengthy discussion with the patient, recommended we follow-up in 6 to 8 weeks and provide her with an injection should she still have that intermittent pain and discomfort.  Fortunately for the patient is not 24/7 pain it is  not constant or persistent and does not keep her up at night she is not thinking about the discomfort in her shoulder.  She will likely call to cancel if there is no need for an injection otherwise we will consider subacromial bursa injection versus intra-articular shot of the right shoulder going forward.  No orders of the defined types were placed in this encounter.          At the conclusion of the visit there were no further questions by the patient/family regarding their plan of care.  Patient was instructed to call or return with any issues, questions, or concerns regarding their injury and/or treatment plan described above.     03/20/24 at 1:32 PM - Cole C Budinsky, MD    Office: [phone]    This note was prepared using voice recognition software.  The details of this note are correct and have been reviewed, and corrected to the best of my ability.  Some grammatical errors may persist related to the Dragon software.

## 2024-04-23 ENCOUNTER — OFFICE VISIT (OUTPATIENT)
Dept: PRIMARY CARE | Facility: CLINIC | Age: 84
End: 2024-04-23
Payer: MEDICARE

## 2024-04-23 VITALS
RESPIRATION RATE: 16 BRPM | WEIGHT: 125 LBS | DIASTOLIC BLOOD PRESSURE: 78 MMHG | SYSTOLIC BLOOD PRESSURE: 122 MMHG | BODY MASS INDEX: 26.24 KG/M2 | HEIGHT: 58 IN | OXYGEN SATURATION: 94 % | HEART RATE: 74 BPM | TEMPERATURE: 96.8 F

## 2024-04-23 DIAGNOSIS — Z12.31 SCREENING MAMMOGRAM FOR BREAST CANCER: ICD-10-CM

## 2024-04-23 DIAGNOSIS — Z00.00 MEDICARE ANNUAL WELLNESS VISIT, SUBSEQUENT: ICD-10-CM

## 2024-04-23 DIAGNOSIS — E78.5 HYPERLIPIDEMIA, UNSPECIFIED HYPERLIPIDEMIA TYPE: ICD-10-CM

## 2024-04-23 DIAGNOSIS — I10 PRIMARY HYPERTENSION: ICD-10-CM

## 2024-04-23 DIAGNOSIS — B35.1 NAIL FUNGUS: Primary | ICD-10-CM

## 2024-04-23 PROCEDURE — 1159F MED LIST DOCD IN RCRD: CPT | Performed by: FAMILY MEDICINE

## 2024-04-23 PROCEDURE — 1157F ADVNC CARE PLAN IN RCRD: CPT | Performed by: FAMILY MEDICINE

## 2024-04-23 PROCEDURE — 3074F SYST BP LT 130 MM HG: CPT | Performed by: FAMILY MEDICINE

## 2024-04-23 PROCEDURE — G0439 PPPS, SUBSEQ VISIT: HCPCS | Performed by: FAMILY MEDICINE

## 2024-04-23 PROCEDURE — 1170F FXNL STATUS ASSESSED: CPT | Performed by: FAMILY MEDICINE

## 2024-04-23 PROCEDURE — 1036F TOBACCO NON-USER: CPT | Performed by: FAMILY MEDICINE

## 2024-04-23 PROCEDURE — 3078F DIAST BP <80 MM HG: CPT | Performed by: FAMILY MEDICINE

## 2024-04-23 PROCEDURE — 1160F RVW MEDS BY RX/DR IN RCRD: CPT | Performed by: FAMILY MEDICINE

## 2024-04-23 RX ORDER — METOPROLOL TARTRATE 50 MG/1
50 TABLET ORAL 2 TIMES DAILY
Qty: 180 TABLET | Refills: 1 | Status: SHIPPED | OUTPATIENT
Start: 2024-04-23

## 2024-04-23 RX ORDER — ATORVASTATIN CALCIUM 20 MG/1
20 TABLET, FILM COATED ORAL DAILY
Qty: 90 TABLET | Refills: 1 | Status: SHIPPED | OUTPATIENT
Start: 2024-04-23

## 2024-04-23 RX ORDER — TERBINAFINE HYDROCHLORIDE 250 MG/1
250 TABLET ORAL DAILY
Qty: 30 TABLET | Refills: 2 | Status: SHIPPED | OUTPATIENT
Start: 2024-04-23 | End: 2024-05-23

## 2024-04-23 RX ORDER — LISINOPRIL 10 MG/1
10 TABLET ORAL DAILY
Qty: 90 TABLET | Refills: 1 | Status: SHIPPED | OUTPATIENT
Start: 2024-04-23

## 2024-04-23 ASSESSMENT — ENCOUNTER SYMPTOMS
DEPRESSION: 0
OCCASIONAL FEELINGS OF UNSTEADINESS: 0
LOSS OF SENSATION IN FEET: 0

## 2024-04-23 ASSESSMENT — ACTIVITIES OF DAILY LIVING (ADL)
GROCERY_SHOPPING: INDEPENDENT
BATHING: INDEPENDENT
TAKING_MEDICATION: INDEPENDENT
MANAGING_FINANCES: INDEPENDENT
DRESSING: INDEPENDENT
DOING_HOUSEWORK: INDEPENDENT

## 2024-04-23 NOTE — PROGRESS NOTES
Subjective   Patient ID: Lorraine Loja is a 83 y.o. female who presents for Hypertension, Hyperlipidemia, and Medicare Annual Wellness Visit Subsequent.    HPI    Patient presents today for AWV.    HTN, HLD follow up   Denies chest pain,SOB  Denies any swelling, headaches, lightheadedness or dizziness  Does not check BP at home   Currently taking Lisinopril, Metoprolol, Atorvastatin  Eats generally healthy diet.  Staying active .        No other concern/question   Advanced Care Planning  Lorraine Loja and I discussed their advance care plan preferences such as living will, and durable health care power of , which include: yes Attempt Resuscitation, no Intubate & Ventilate, no Comfort Care or Life- Sustaning Care. I reminded patient to talk with their health care agent, [employer] , about their health care goals. Note reflects patient's free will and care goals as expressed to me.     Review of Systems  Constitutional:  no chills, no fever and no night sweats.  Eyes: no blurred vision and no eyesight problems.  ENT: no hearing loss, no nasal congestion, no hoarseness and no sore throat.  Neck: no mass (es) and no swelling.  Cardiovascular: no chest pain, no intermittent leg claudication, no lower extremity edema, no palpitation and no syncope.  Respiratory: no cough, no shortness of breath during exertion, no shortness of breath at rest and no wheezing.  Gastrointestinal: no abdominal pain, no blood in stools, no constipation, no diarrhea, no melena, no nausea, no rectal pain and no vomiting.  Genitourinary: no dysuria, no change in urinary frequency, no urinary hesitancy and no feelings of urinary urgency.  Musculoskeletal: no arthralgias, no back pain and no myalgias.  Integumentary: no new skin lesions and no rashes.  Neurological: no difficulty walking, no headache, no limb weakness, no numbness and no tingling.  Psychiatric/Behavioral: no anxiety, no depression, no anhedonia and no substance use  disorders.  Endocrine: no recent weight gain and no recent weight loss.  Hematologic/Lymphatic: no tendency for easy bruising and no swollen glands    Objective   Physical Exam  Patient here for 6-month follow-up and annual Medicare wellness exam doing well no complaints.  Needs medication refill due for month mammogram also.  Well-developed well-nourished female in no acute distress.  Physical exam today's office visit constitutional alert and oriented x3.    Head is atraumatic HEENT is within normal limits.    Neck supple no masses full range of motion.    Thyroid is normal in size no thyromegaly there is no carotid bruits.    Pulmonary exam shows clear to auscultation no respiratory distress.    Cardiovascular shows no murmur rub or gallop.  Regular rate and rhythm.    Abdominal exam soft nontender no hepatosplenomegaly or masses normal bowel sounds no rebound no guarding.    Musculoskeletal exam no joint pain no muscle pain full range of motion.    Psych exam normal mood and affect.    Dermatologic exam no skin lesions no rash no blemishes.    Neuro exam is no focal deficits.  Normal exam.    Extremities no edema normal pulses normal capillary refill.    There were no vitals taken for this visit.    Lab Results   Component Value Date    WBC 8.6 11/02/2023    HGB 13.9 11/02/2023    HCT 42.3 11/02/2023    MCV 97 11/02/2023     11/02/2023       Assessment/Plan strong family history of breast cancer is due for mammogram we will get that done she has not noticed any new breast masses but she has had biopsies that were benign in the past.  Refill her medication follow-up when we have the mammogram results of doing well routine recheck 6 months  Problem List Items Addressed This Visit    None  Visit Diagnoses       Medicare annual wellness visit, subsequent        Hyperlipidemia, unspecified hyperlipidemia type        Primary hypertension

## 2024-05-08 ENCOUNTER — HOSPITAL ENCOUNTER (OUTPATIENT)
Dept: RADIOLOGY | Facility: EXTERNAL LOCATION | Age: 84
Discharge: HOME | End: 2024-05-08

## 2024-05-08 ENCOUNTER — OFFICE VISIT (OUTPATIENT)
Dept: ORTHOPEDIC SURGERY | Facility: CLINIC | Age: 84
End: 2024-05-08
Payer: MEDICARE

## 2024-05-08 DIAGNOSIS — M75.51 SUBACROMIAL BURSITIS OF RIGHT SHOULDER JOINT: Primary | ICD-10-CM

## 2024-05-08 PROCEDURE — 99213 OFFICE O/P EST LOW 20 MIN: CPT | Performed by: FAMILY MEDICINE

## 2024-05-08 PROCEDURE — 1160F RVW MEDS BY RX/DR IN RCRD: CPT | Performed by: FAMILY MEDICINE

## 2024-05-08 PROCEDURE — 1159F MED LIST DOCD IN RCRD: CPT | Performed by: FAMILY MEDICINE

## 2024-05-08 PROCEDURE — 1036F TOBACCO NON-USER: CPT | Performed by: FAMILY MEDICINE

## 2024-05-08 PROCEDURE — 2500000005 HC RX 250 GENERAL PHARMACY W/O HCPCS: Performed by: FAMILY MEDICINE

## 2024-05-08 PROCEDURE — 1157F ADVNC CARE PLAN IN RCRD: CPT | Performed by: FAMILY MEDICINE

## 2024-05-08 PROCEDURE — 2500000004 HC RX 250 GENERAL PHARMACY W/ HCPCS (ALT 636 FOR OP/ED): Performed by: FAMILY MEDICINE

## 2024-05-08 PROCEDURE — 20611 DRAIN/INJ JOINT/BURSA W/US: CPT | Mod: RT | Performed by: FAMILY MEDICINE

## 2024-05-08 PROCEDURE — 99214 OFFICE O/P EST MOD 30 MIN: CPT | Performed by: FAMILY MEDICINE

## 2024-05-08 RX ORDER — BETAMETHASONE SODIUM PHOSPHATE AND BETAMETHASONE ACETATE 3; 3 MG/ML; MG/ML
12 INJECTION, SUSPENSION INTRA-ARTICULAR; INTRALESIONAL; INTRAMUSCULAR; SOFT TISSUE
Status: COMPLETED | OUTPATIENT
Start: 2024-05-08 | End: 2024-05-08

## 2024-05-08 RX ORDER — LIDOCAINE HYDROCHLORIDE 10 MG/ML
6 INJECTION INFILTRATION; PERINEURAL
Status: COMPLETED | OUTPATIENT
Start: 2024-05-08 | End: 2024-05-08

## 2024-05-08 RX ADMIN — BETAMETHASONE ACETATE AND BETAMETHASONE SODIUM PHOSPHATE 12 MG: 3; 3 INJECTION, SUSPENSION INTRA-ARTICULAR; INTRALESIONAL; INTRAMUSCULAR; SOFT TISSUE at 13:07

## 2024-05-08 RX ADMIN — LIDOCAINE HYDROCHLORIDE 6 ML: 10 INJECTION, SOLUTION INFILTRATION; PERINEURAL at 13:07

## 2024-05-08 NOTE — PROGRESS NOTES
Established Patient Follow-Up Visit    CC:   Chief Complaint   Patient presents with    Right Shoulder - Follow-up     Dislocation   Poss injection today       HPI:  Lorraine is a 83 y.o. female returns here today for follow-up visit regarding: Persistent pain discomfort the lateral side of the right shoulder.  She status post shoulder dislocation she has some intermittent return of pain to the lateral side of the shoulder most notably when doing different activities specifically reaching and grabbing her seatbelt.  She would like to try an injection here today.          REVIEW OF SYSTEMS:  GENERAL: Negative for malaise, significant weight loss, fever  MUSCULOSKELETAL: See HPI  NEURO: Negative for numbness / tingling       PHYSICAL EXAM:  -Neuro: Gross sensation intact to the upper extremities bilaterally.  -Extremity: Right shoulder demonstrates tenderness palpation over the subacromial bursa and lateral deltoid.  She has no pain or discomfort over the biceps tendon proximally.  She has no posterior shoulder pain no obvious crepitus clicking or locking.  Negative Neer's Diaz and [place] she has 5 out of 5 strength with resisted forward flexion lateral abduction and otherwise normal internal and external rotation.    IMAGING: No new imaging today      PROCEDURE: None  L Inj/Asp: R subacromial bursa on 5/8/2024 1:07 PM  Indications: pain  Details: 22 G needle, ultrasound-guided superolateral approach  Medications: 6 mL lidocaine 10 mg/mL (1 %); 12 mg betamethasone acet,sod phos 6 mg/mL  Outcome: tolerated well, no immediate complications  Procedure, treatment alternatives, risks and benefits explained, specific risks discussed. Consent was given by the patient. Immediately prior to procedure a time out was called to verify the correct patient, procedure, equipment, support staff and site/side marked as required. Patient was prepped and draped in the usual sterile fashion.       Ultrasound Guided right  subacromial bursitis Injection:    Before aspiration/injection, the risks  of this procedure including but not limited to;  infection, local skin irritation, skin atrophy, calcification, continued pain or discomfort, elevated blood sugar, burning, failure to relieve pain, possible late infection were all discussed with the patient.  The patient verbalized understanding and consented to the procedure.     After informed consent was provided, patient identification was confirmed, and allergies were verified, the patient was appropriately positioned. The patient's [right] subacromial bursae was evaluated via ultrasound in long axis to identify the subacromial space.    The site was marked and time-out performed.  The injection site was prepped in the usual sterile manner to provide a sterile environment. The skin was anesthetized with ethyl chloride spray. The injection was performed with standard technique. A 22G needle was passed through the skin into the lateral subacromial space under direct ultrasound guidance with sterile precautions in a lateral to medial approach. Next, [8] cc´s of injectate consisting of [2] cc´s of [Celestone] and [6] cc´s of 1% lidocaine without epinephrine was instilled into the bursal space.    The needle was withdrawn and the puncture site was secured with a Band-Aid. The patient tolerated the procedure well without complication.     Post-procedure discomfort can be alleviated with additional medication, ice, elevation, and rest over the first 24 hours as recommended.      ASSESSMENT:   Follow-up visit for:  Problem List Items Addressed This Visit    None  Visit Diagnoses       Subacromial bursitis of right shoulder joint    -  Primary    Relevant Orders    Point of Care Ultrasound (Completed)             PLAN: At this time we will offer the patient a subacromial bursa injection to the right shoulder.  Will see her back in 3 to 4 weeks for repeat evaluation if this injection does not help  we will consider a true intra-articular injection at that time.  She will continue with activities as able and tolerated otherwise going forward.  Postprocedure she noted to have good range of motion and no obvious pain or discomfort but likely injection was helpful.  Should she be under percent better not have any issues she can call to cancel her follow-up.  Orders Placed This Encounter    L Inj/Asp    Point of Care Ultrasound           At the conclusion of the visit there were no further questions by the patient/family regarding their plan of care.  Patient was instructed to call or return with any issues, questions, or concerns regarding their injury and/or treatment plan described above.     05/08/24 at 1:18 PM - Cole C Budinsky, MD    Office: [phone]    This note was prepared using voice recognition software.  The details of this note are correct and have been reviewed, and corrected to the best of my ability.  Some grammatical errors may persist related to the Dragon software.

## 2024-05-14 ENCOUNTER — APPOINTMENT (OUTPATIENT)
Dept: RADIOLOGY | Facility: CLINIC | Age: 84
End: 2024-05-14
Payer: MEDICARE

## 2024-05-22 ENCOUNTER — HOSPITAL ENCOUNTER (OUTPATIENT)
Dept: RADIOLOGY | Facility: CLINIC | Age: 84
Discharge: HOME | End: 2024-05-22
Payer: MEDICARE

## 2024-05-22 VITALS — HEIGHT: 58 IN | BODY MASS INDEX: 25.82 KG/M2 | WEIGHT: 123 LBS

## 2024-05-22 DIAGNOSIS — Z00.00 MEDICARE ANNUAL WELLNESS VISIT, SUBSEQUENT: ICD-10-CM

## 2024-05-22 DIAGNOSIS — Z12.31 SCREENING MAMMOGRAM FOR BREAST CANCER: ICD-10-CM

## 2024-05-22 PROCEDURE — 77067 SCR MAMMO BI INCL CAD: CPT

## 2024-05-22 PROCEDURE — 77063 BREAST TOMOSYNTHESIS BI: CPT | Performed by: STUDENT IN AN ORGANIZED HEALTH CARE EDUCATION/TRAINING PROGRAM

## 2024-05-22 PROCEDURE — 77067 SCR MAMMO BI INCL CAD: CPT | Performed by: STUDENT IN AN ORGANIZED HEALTH CARE EDUCATION/TRAINING PROGRAM

## 2024-05-24 ENCOUNTER — HOSPITAL ENCOUNTER (OUTPATIENT)
Dept: RADIOLOGY | Facility: EXTERNAL LOCATION | Age: 84
Discharge: HOME | End: 2024-05-24
Payer: MEDICARE

## 2024-05-29 ENCOUNTER — TELEPHONE (OUTPATIENT)
Dept: PRIMARY CARE | Facility: CLINIC | Age: 84
End: 2024-05-29
Payer: MEDICARE

## 2024-05-29 NOTE — TELEPHONE ENCOUNTER
[employer] message sent    ----- Message from Leonidas Diaz MD sent at 5/29/2024  8:28 AM EDT -----  Mammogram normal repeat in 1 year

## 2024-06-05 ENCOUNTER — APPOINTMENT (OUTPATIENT)
Dept: ORTHOPEDIC SURGERY | Facility: CLINIC | Age: 84
End: 2024-06-05
Payer: MEDICARE

## 2024-08-14 ENCOUNTER — HOSPITAL ENCOUNTER (OUTPATIENT)
Dept: RADIOLOGY | Facility: CLINIC | Age: 84
Discharge: HOME | End: 2024-08-14
Payer: MEDICARE

## 2024-08-14 DIAGNOSIS — Z78.0 POSTMENOPAUSAL: ICD-10-CM

## 2024-08-14 PROCEDURE — 77080 DXA BONE DENSITY AXIAL: CPT

## 2024-08-14 ASSESSMENT — LIFESTYLE VARIABLES
CURRENT_SMOKER: N
3_OR_MORE_DRINKS_PER_DAY: N

## 2024-08-16 ENCOUNTER — TELEPHONE (OUTPATIENT)
Dept: PRIMARY CARE | Facility: CLINIC | Age: 84
End: 2024-08-16
Payer: MEDICARE

## 2024-08-16 NOTE — TELEPHONE ENCOUNTER
----- Message from Leonidas Diaz sent at 8/15/2024  8:05 PM EDT -----  Bone density shows mild osteopenia.  Continue calcium and vitamin D supplementation

## 2024-09-05 DIAGNOSIS — I10 PRIMARY HYPERTENSION: ICD-10-CM

## 2024-09-05 DIAGNOSIS — E78.5 HYPERLIPIDEMIA, UNSPECIFIED HYPERLIPIDEMIA TYPE: ICD-10-CM

## 2024-09-05 RX ORDER — LISINOPRIL 10 MG/1
10 TABLET ORAL DAILY
Qty: 90 TABLET | Refills: 0 | Status: SHIPPED | OUTPATIENT
Start: 2024-09-05

## 2024-09-05 RX ORDER — ATORVASTATIN CALCIUM 20 MG/1
20 TABLET, FILM COATED ORAL DAILY
Qty: 90 TABLET | Refills: 0 | Status: SHIPPED | OUTPATIENT
Start: 2024-09-05

## 2024-09-05 RX ORDER — METOPROLOL TARTRATE 50 MG/1
50 TABLET ORAL 2 TIMES DAILY
Qty: 180 TABLET | Refills: 0 | Status: SHIPPED | OUTPATIENT
Start: 2024-09-05

## 2024-10-22 ENCOUNTER — APPOINTMENT (OUTPATIENT)
Dept: PRIMARY CARE | Facility: CLINIC | Age: 84
End: 2024-10-22

## 2024-10-22 ASSESSMENT — ENCOUNTER SYMPTOMS
PERIANAL NUMBNESS: 0
PARESTHESIAS: 0
WEAKNESS: 0
BOWEL INCONTINENCE: 0
TINGLING: 0
NUMBNESS: 0
BACK PAIN: 1
DYSURIA: 0
LEG PAIN: 0
ABDOMINAL PAIN: 0
PARESIS: 0
FEVER: 0
WEIGHT LOSS: 0
HEADACHES: 0

## 2024-10-23 ENCOUNTER — APPOINTMENT (OUTPATIENT)
Dept: PRIMARY CARE | Facility: CLINIC | Age: 84
End: 2024-10-23

## 2024-10-23 VITALS
DIASTOLIC BLOOD PRESSURE: 80 MMHG | SYSTOLIC BLOOD PRESSURE: 128 MMHG | RESPIRATION RATE: 18 BRPM | BODY MASS INDEX: 27.29 KG/M2 | HEIGHT: 58 IN | WEIGHT: 130 LBS

## 2024-10-23 DIAGNOSIS — E78.5 HYPERLIPIDEMIA, UNSPECIFIED HYPERLIPIDEMIA TYPE: ICD-10-CM

## 2024-10-23 DIAGNOSIS — R20.2 LEFT HAND PARESTHESIA: ICD-10-CM

## 2024-10-23 DIAGNOSIS — I10 PRIMARY HYPERTENSION: ICD-10-CM

## 2024-10-23 DIAGNOSIS — M54.50 LOW BACK PAIN, UNSPECIFIED BACK PAIN LATERALITY, UNSPECIFIED CHRONICITY, UNSPECIFIED WHETHER SCIATICA PRESENT: ICD-10-CM

## 2024-10-23 DIAGNOSIS — L81.9 PIGMENTED SKIN LESION: Primary | ICD-10-CM

## 2024-10-23 PROCEDURE — 3074F SYST BP LT 130 MM HG: CPT | Performed by: FAMILY MEDICINE

## 2024-10-23 PROCEDURE — 1157F ADVNC CARE PLAN IN RCRD: CPT | Performed by: FAMILY MEDICINE

## 2024-10-23 PROCEDURE — 1123F ACP DISCUSS/DSCN MKR DOCD: CPT | Performed by: FAMILY MEDICINE

## 2024-10-23 PROCEDURE — 99213 OFFICE O/P EST LOW 20 MIN: CPT | Performed by: FAMILY MEDICINE

## 2024-10-23 PROCEDURE — 3079F DIAST BP 80-89 MM HG: CPT | Performed by: FAMILY MEDICINE

## 2024-10-23 PROCEDURE — 1159F MED LIST DOCD IN RCRD: CPT | Performed by: FAMILY MEDICINE

## 2024-10-23 ASSESSMENT — PATIENT HEALTH QUESTIONNAIRE - PHQ9
2. FEELING DOWN, DEPRESSED OR HOPELESS: NOT AT ALL
SUM OF ALL RESPONSES TO PHQ9 QUESTIONS 1 AND 2: 0
1. LITTLE INTEREST OR PLEASURE IN DOING THINGS: NOT AT ALL

## 2024-10-23 NOTE — PROGRESS NOTES
Subjective   Patient ID: Lorraine Loja is a 84 y.o. female who presents for Hypertension, Hyperlipidemia, and Back Pain.  HPI    Patient presents in office today for HTN, HLD follow up. Tries to follow a low sugar, low sodium, low fat diet. Stays active. Does not check BP at home. Denies any side effects from medications.  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     Left hand tingling. Has a hard time holding things. Admits to her hand sometimes going numb.     Lower back pain. Ongoing for years. Patient use to go to a chiropractor when in [place]. Patient admits that she was going weekly. Would like to see another one.     Review of Systems  Constitutional:  no chills, no fever and no night sweats.  Eyes: no blurred vision and no eyesight problems.  ENT: no hearing loss, no nasal congestion, no hoarseness and no sore throat.  Neck: no mass (es) and no swelling.  Cardiovascular: no chest pain, no intermittent leg claudication, no lower extremity edema, no palpitation and no syncope.  Respiratory: no cough, no shortness of breath during exertion, no shortness of breath at rest and no wheezing.  Gastrointestinal: no abdominal pain, no blood in stools, no constipation, no diarrhea, no melena, no nausea, no rectal pain and no vomiting.  Genitourinary: no dysuria, no change in urinary frequency, no urinary hesitancy and no feelings of urinary urgency.  Musculoskeletal: no arthralgias, no back pain and no myalgias.  Integumentary: no new skin lesions and no rashes.  Neurological: no difficulty walking, no headache, no limb weakness, no numbness and no tingling.  Psychiatric/Behavioral: no anxiety, no depression, no anhedonia and no substance use disorders.  Endocrine: no recent weight gain and no recent weight loss.  Hematologic/Lymphatic: no tendency for easy bruising and no swollen glands    Objective   Physical Exam  Patient in for  "follow-up hypertension hyperlipidemia chronic low back pain secondary arthritis this is all stable at present no new complaints.  Due for blood work would like to see dermatologist that she has multiple lesions on her skin a few seborrheic keratosis's are noted but she wants a full-body evaluation by Derm.  Also history of low back and spine pain will refer back to chiropractor she was being seen every few weeks soon in [place].  Denies chest pain or shortness of breath with exertion.  Exam elderly female in no acute distress.  Physical exam today's office visit constitutional alert and oriented x3.    Head is atraumatic HEENT is within normal limits.    Neck supple no masses full range of motion.    Thyroid is normal in size no thyromegaly there is no carotid bruits.    Pulmonary exam shows clear to auscultation no respiratory distress.    Cardiovascular shows no murmur rub or gallop.  Regular rate and rhythm.    Abdominal exam soft nontender no hepatosplenomegaly or masses normal bowel sounds no rebound no guarding.    Musculoskeletal exam no joint pain no muscle pain full range of motion.    Psych exam normal mood and affect.    Dermatologic exam no skin lesions no rash no blemishes.    Neuro exam is no focal deficits.  Normal exam.    Extremities no edema normal pulses normal capillary refill.    /80   Resp 18   Ht 1.473 m (4' 10\")   Wt 59 kg (130 lb)   BMI 27.17 kg/m²     Lab Results   Component Value Date    WBC 8.6 11/02/2023    HGB 13.9 11/02/2023    HCT 42.3 11/02/2023    MCV 97 11/02/2023     11/02/2023       Assessment/Plan plan is to get blood drawn refer to Derm and chiropractor await their evaluation routine recheck 6 months  Problem List Items Addressed This Visit       Primary hypertension    Hyperlipidemia     Other Visit Diagnoses       Left hand paresthesia        Low back pain, unspecified back pain laterality, unspecified chronicity, unspecified whether sciatica present          "

## 2024-10-30 ENCOUNTER — LAB (OUTPATIENT)
Dept: LAB | Facility: LAB | Age: 84
End: 2024-10-30
Payer: MEDICARE

## 2024-10-30 DIAGNOSIS — E78.5 HYPERLIPIDEMIA, UNSPECIFIED HYPERLIPIDEMIA TYPE: ICD-10-CM

## 2024-10-30 DIAGNOSIS — I10 PRIMARY HYPERTENSION: ICD-10-CM

## 2024-10-30 LAB
ALBUMIN SERPL BCP-MCNC: 4.1 G/DL (ref 3.4–5)
ALP SERPL-CCNC: 59 U/L (ref 33–136)
ALT SERPL W P-5'-P-CCNC: 18 U/L (ref 7–45)
ANION GAP SERPL CALC-SCNC: 15 MMOL/L (ref 10–20)
AST SERPL W P-5'-P-CCNC: 20 U/L (ref 9–39)
BASOPHILS # BLD AUTO: 0.07 X10*3/UL (ref 0–0.1)
BASOPHILS NFR BLD AUTO: 1.1 %
BILIRUB SERPL-MCNC: 0.6 MG/DL (ref 0–1.2)
BUN SERPL-MCNC: 25 MG/DL (ref 6–23)
CALCIUM SERPL-MCNC: 9.8 MG/DL (ref 8.6–10.3)
CHLORIDE SERPL-SCNC: 103 MMOL/L (ref 98–107)
CHOLEST SERPL-MCNC: 184 MG/DL (ref 0–199)
CHOLESTEROL/HDL RATIO: 3.6
CO2 SERPL-SCNC: 26 MMOL/L (ref 21–32)
CREAT SERPL-MCNC: 0.92 MG/DL (ref 0.5–1.05)
EGFRCR SERPLBLD CKD-EPI 2021: 62 ML/MIN/1.73M*2
EOSINOPHIL # BLD AUTO: 0.1 X10*3/UL (ref 0–0.4)
EOSINOPHIL NFR BLD AUTO: 1.6 %
ERYTHROCYTE [DISTWIDTH] IN BLOOD BY AUTOMATED COUNT: 13 % (ref 11.5–14.5)
GLUCOSE SERPL-MCNC: 114 MG/DL (ref 74–99)
HCT VFR BLD AUTO: 40.8 % (ref 36–46)
HDLC SERPL-MCNC: 51.3 MG/DL
HGB BLD-MCNC: 13.5 G/DL (ref 12–16)
IMM GRANULOCYTES # BLD AUTO: 0.02 X10*3/UL (ref 0–0.5)
IMM GRANULOCYTES NFR BLD AUTO: 0.3 % (ref 0–0.9)
LDLC SERPL CALC-MCNC: 103 MG/DL
LYMPHOCYTES # BLD AUTO: 1.43 X10*3/UL (ref 0.8–3)
LYMPHOCYTES NFR BLD AUTO: 23.3 %
MCH RBC QN AUTO: 32.1 PG (ref 26–34)
MCHC RBC AUTO-ENTMCNC: 33.1 G/DL (ref 32–36)
MCV RBC AUTO: 97 FL (ref 80–100)
MONOCYTES # BLD AUTO: 0.73 X10*3/UL (ref 0.05–0.8)
MONOCYTES NFR BLD AUTO: 11.9 %
NEUTROPHILS # BLD AUTO: 3.8 X10*3/UL (ref 1.6–5.5)
NEUTROPHILS NFR BLD AUTO: 61.8 %
NON HDL CHOLESTEROL: 133 MG/DL (ref 0–149)
NRBC BLD-RTO: 0 /100 WBCS (ref 0–0)
PLATELET # BLD AUTO: 391 X10*3/UL (ref 150–450)
POTASSIUM SERPL-SCNC: 4.8 MMOL/L (ref 3.5–5.3)
PROT SERPL-MCNC: 6.4 G/DL (ref 6.4–8.2)
RBC # BLD AUTO: 4.2 X10*6/UL (ref 4–5.2)
SODIUM SERPL-SCNC: 139 MMOL/L (ref 136–145)
TRIGL SERPL-MCNC: 148 MG/DL (ref 0–149)
VLDL: 30 MG/DL (ref 0–40)
WBC # BLD AUTO: 6.2 X10*3/UL (ref 4.4–11.3)

## 2024-10-30 PROCEDURE — 36415 COLL VENOUS BLD VENIPUNCTURE: CPT

## 2024-10-30 PROCEDURE — 80053 COMPREHEN METABOLIC PANEL: CPT

## 2024-10-30 PROCEDURE — 80061 LIPID PANEL: CPT

## 2024-10-30 PROCEDURE — 85025 COMPLETE CBC W/AUTO DIFF WBC: CPT

## 2024-10-31 ENCOUNTER — TELEPHONE (OUTPATIENT)
Dept: PRIMARY CARE | Facility: CLINIC | Age: 84
End: 2024-10-31
Payer: MEDICARE

## 2024-11-25 DIAGNOSIS — E78.5 HYPERLIPIDEMIA, UNSPECIFIED HYPERLIPIDEMIA TYPE: ICD-10-CM

## 2024-11-25 DIAGNOSIS — I10 PRIMARY HYPERTENSION: ICD-10-CM

## 2024-11-25 RX ORDER — ATORVASTATIN CALCIUM 20 MG/1
20 TABLET, FILM COATED ORAL DAILY
Qty: 90 TABLET | Refills: 1 | Status: SHIPPED | OUTPATIENT
Start: 2024-11-25

## 2024-11-25 RX ORDER — LISINOPRIL 10 MG/1
10 TABLET ORAL DAILY
Qty: 90 TABLET | Refills: 1 | Status: SHIPPED | OUTPATIENT
Start: 2024-11-25

## 2024-11-25 RX ORDER — METOPROLOL TARTRATE 50 MG/1
50 TABLET ORAL 2 TIMES DAILY
Qty: 180 TABLET | Refills: 1 | Status: SHIPPED | OUTPATIENT
Start: 2024-11-25

## 2024-12-11 ENCOUNTER — TELEPHONE (OUTPATIENT)
Dept: PRIMARY CARE | Facility: CLINIC | Age: 84
End: 2024-12-11
Payer: MEDICARE

## 2024-12-11 DIAGNOSIS — I10 PRIMARY HYPERTENSION: ICD-10-CM

## 2024-12-11 NOTE — TELEPHONE ENCOUNTER
Leonidas Diaz MD  [id number] [id number] Clinical Support Staff7 minutes ago (2:21 PM)       She needs a BMP/CBC with an EKG done before she has her dental appointment

## 2024-12-13 ENCOUNTER — CLINICAL SUPPORT (OUTPATIENT)
Dept: PRIMARY CARE | Facility: CLINIC | Age: 84
End: 2024-12-13
Payer: MEDICARE

## 2024-12-13 DIAGNOSIS — Z01.818 PRE-OP EXAM: ICD-10-CM

## 2024-12-14 ENCOUNTER — LAB (OUTPATIENT)
Dept: LAB | Facility: LAB | Age: 84
End: 2024-12-14
Payer: MEDICARE

## 2024-12-14 DIAGNOSIS — I10 PRIMARY HYPERTENSION: ICD-10-CM

## 2024-12-14 LAB
ANION GAP SERPL CALC-SCNC: 14 MMOL/L (ref 10–20)
BASOPHILS # BLD AUTO: 0.1 X10*3/UL (ref 0–0.1)
BASOPHILS NFR BLD AUTO: 1 %
BUN SERPL-MCNC: 24 MG/DL (ref 6–23)
CALCIUM SERPL-MCNC: 9.8 MG/DL (ref 8.6–10.3)
CHLORIDE SERPL-SCNC: 103 MMOL/L (ref 98–107)
CO2 SERPL-SCNC: 28 MMOL/L (ref 21–32)
CREAT SERPL-MCNC: 0.85 MG/DL (ref 0.5–1.05)
EGFRCR SERPLBLD CKD-EPI 2021: 69 ML/MIN/1.73M*2
EOSINOPHIL # BLD AUTO: 0.05 X10*3/UL (ref 0–0.4)
EOSINOPHIL NFR BLD AUTO: 0.5 %
ERYTHROCYTE [DISTWIDTH] IN BLOOD BY AUTOMATED COUNT: 12.6 % (ref 11.5–14.5)
GLUCOSE SERPL-MCNC: 110 MG/DL (ref 74–99)
HCT VFR BLD AUTO: 39.5 % (ref 36–46)
HGB BLD-MCNC: 13.2 G/DL (ref 12–16)
IMM GRANULOCYTES # BLD AUTO: 0.03 X10*3/UL (ref 0–0.5)
IMM GRANULOCYTES NFR BLD AUTO: 0.3 % (ref 0–0.9)
LYMPHOCYTES # BLD AUTO: 1.41 X10*3/UL (ref 0.8–3)
LYMPHOCYTES NFR BLD AUTO: 14.5 %
MCH RBC QN AUTO: 32.5 PG (ref 26–34)
MCHC RBC AUTO-ENTMCNC: 33.4 G/DL (ref 32–36)
MCV RBC AUTO: 97 FL (ref 80–100)
MONOCYTES # BLD AUTO: 0.77 X10*3/UL (ref 0.05–0.8)
MONOCYTES NFR BLD AUTO: 7.9 %
NEUTROPHILS # BLD AUTO: 7.38 X10*3/UL (ref 1.6–5.5)
NEUTROPHILS NFR BLD AUTO: 75.8 %
NRBC BLD-RTO: 0 /100 WBCS (ref 0–0)
PLATELET # BLD AUTO: 398 X10*3/UL (ref 150–450)
POTASSIUM SERPL-SCNC: 4.4 MMOL/L (ref 3.5–5.3)
RBC # BLD AUTO: 4.06 X10*6/UL (ref 4–5.2)
SODIUM SERPL-SCNC: 141 MMOL/L (ref 136–145)
WBC # BLD AUTO: 9.7 X10*3/UL (ref 4.4–11.3)

## 2024-12-14 PROCEDURE — 36415 COLL VENOUS BLD VENIPUNCTURE: CPT

## 2024-12-14 PROCEDURE — 80048 BASIC METABOLIC PNL TOTAL CA: CPT

## 2024-12-14 PROCEDURE — 85025 COMPLETE CBC W/AUTO DIFF WBC: CPT

## 2024-12-18 ENCOUNTER — TELEPHONE (OUTPATIENT)
Dept: PRIMARY CARE | Facility: CLINIC | Age: 84
End: 2024-12-18
Payer: MEDICARE

## 2024-12-18 NOTE — TELEPHONE ENCOUNTER
PLEASE ADVISE OF LABS SO WE CAN SEND THEM OVER: PAPER ON YOUR DESK WITH EKG THAT YOU SIGNED OFF ON: PLEASE RETURN TO [facility]

## 2024-12-18 NOTE — TELEPHONE ENCOUNTER
PATIENT IS CALLING - WAS IN OUR OFFICE ON 12/13/24, WE DID A EKG AND SHE HAD LAB WORK DRAWN - IT WAS SUPPOSE TO BE FAXED OVER TO [facility] - BUT HAS HEARD NOTHING.  DR. SOSA HAS NOT REVIEWED LAB WORK YET.  CAN YOU PLEASE FIND OUT OF THIS PATIENT WHAT IS GOING ON AND LET HER KNOW?      THANK YOU!

## 2024-12-20 NOTE — TELEPHONE ENCOUNTER
----- Message from Leonidas Diaz sent at 12/20/2024  1:58 PM EST -----  Labs are stable.  Recheck in 1 year

## 2025-02-17 ENCOUNTER — APPOINTMENT (OUTPATIENT)
Dept: PRIMARY CARE | Facility: CLINIC | Age: 85
End: 2025-02-17
Payer: MEDICARE

## 2025-02-17 VITALS
OXYGEN SATURATION: 100 % | HEIGHT: 58 IN | BODY MASS INDEX: 27.37 KG/M2 | DIASTOLIC BLOOD PRESSURE: 78 MMHG | WEIGHT: 130.4 LBS | TEMPERATURE: 96.6 F | SYSTOLIC BLOOD PRESSURE: 126 MMHG | HEART RATE: 84 BPM

## 2025-02-17 DIAGNOSIS — I10 PRIMARY HYPERTENSION: ICD-10-CM

## 2025-02-17 DIAGNOSIS — E78.2 MIXED HYPERLIPIDEMIA: ICD-10-CM

## 2025-02-17 DIAGNOSIS — R60.0 LOCALIZED EDEMA: ICD-10-CM

## 2025-02-17 DIAGNOSIS — R60.0 BILATERAL LOWER EXTREMITY EDEMA: Primary | ICD-10-CM

## 2025-02-17 PROCEDURE — 1036F TOBACCO NON-USER: CPT | Performed by: FAMILY MEDICINE

## 2025-02-17 PROCEDURE — 1159F MED LIST DOCD IN RCRD: CPT | Performed by: FAMILY MEDICINE

## 2025-02-17 PROCEDURE — 1123F ACP DISCUSS/DSCN MKR DOCD: CPT | Performed by: FAMILY MEDICINE

## 2025-02-17 PROCEDURE — 99213 OFFICE O/P EST LOW 20 MIN: CPT | Performed by: FAMILY MEDICINE

## 2025-02-17 PROCEDURE — 3078F DIAST BP <80 MM HG: CPT | Performed by: FAMILY MEDICINE

## 2025-02-17 PROCEDURE — 3074F SYST BP LT 130 MM HG: CPT | Performed by: FAMILY MEDICINE

## 2025-02-17 PROCEDURE — 1157F ADVNC CARE PLAN IN RCRD: CPT | Performed by: FAMILY MEDICINE

## 2025-02-17 RX ORDER — FUROSEMIDE 20 MG/1
20 TABLET ORAL 2 TIMES DAILY
Qty: 60 TABLET | Refills: 1 | Status: SHIPPED | OUTPATIENT
Start: 2025-02-17 | End: 2026-02-17

## 2025-02-17 ASSESSMENT — PATIENT HEALTH QUESTIONNAIRE - PHQ9
2. FEELING DOWN, DEPRESSED OR HOPELESS: NOT AT ALL
1. LITTLE INTEREST OR PLEASURE IN DOING THINGS: NOT AT ALL
SUM OF ALL RESPONSES TO PHQ9 QUESTIONS 1 AND 2: 0
SUM OF ALL RESPONSES TO PHQ9 QUESTIONS 1 AND 2: 0
2. FEELING DOWN, DEPRESSED OR HOPELESS: NOT AT ALL
1. LITTLE INTEREST OR PLEASURE IN DOING THINGS: NOT AT ALL

## 2025-02-17 NOTE — PROGRESS NOTES
Subjective   Patient ID: Lorraine Loja is a 84 y.o. female who presents for BILATERAL EDEMA.  HPI    Patient presents in the office today with complaints of swelling in legs and feet. Patient states that she has been sitting in her recliner all the time since she is going through dental implants. Patient states the last dental work that as completed was on 2/6/25. Ongoing X 2-3 weeks. Has tried propping her feet up while sitting.    Review of Systems  Constitutional:  no chills, no fever and no night sweats.  Eyes: no blurred vision and no eyesight problems.  ENT: no hearing loss, no nasal congestion, no hoarseness and no sore throat.  Neck: no mass (es) and no swelling.  Cardiovascular: no chest pain, no intermittent leg claudication, no lower extremity edema, no palpitation and no syncope.  Respiratory: no cough, no shortness of breath during exertion, no shortness of breath at rest and no wheezing.  Gastrointestinal: no abdominal pain, no blood in stools, no constipation, no diarrhea, no melena, no nausea, no rectal pain and no vomiting.  Genitourinary: no dysuria, no change in urinary frequency, no urinary hesitancy and no feelings of urinary urgency.  Musculoskeletal: no arthralgias, no back pain and no myalgias.  Integumentary: no new skin lesions and no rashes.  Neurological: no difficulty walking, no headache, no limb weakness, no numbness and no tingling.  Psychiatric/Behavioral: no anxiety, no depression, no anhedonia and no substance use disorders.  Endocrine: no recent weight gain and no recent weight loss.  Hematologic/Lymphatic: no tendency for easy bruising and no swollen glands    Objective   Physical Exam  Patient with complaints of worsening swelling in her feet bilaterally worse on the left she has been undergoing a lot of dental work been in pain and has been sitting in her recliner a lot has not felt like doing anything this may be contributing also she says she has not been eating normally  "and just eating prepared TV dinners and frozen foods which tend to have a lot of sodium in them.  Lungs are clear cardiac exam regular rate rhythm no murmur rub or gallop no abdominal pain she has pitting edema on both feet worse on the left side negative Homans no evidence of DVT.  /78 (BP Location: Left arm, Patient Position: Sitting)   Pulse 84   Temp 35.9 °C (96.6 °F) (Temporal)   Ht 1.473 m (4' 10\")   Wt 59.1 kg (130 lb 6.4 oz)   SpO2 100%   BMI 27.25 kg/m²     Lab Results   Component Value Date    WBC 9.7 12/14/2024    HGB 13.2 12/14/2024    HCT 39.5 12/14/2024    MCV 97 12/14/2024     12/14/2024       Assessment/Plan plans to start her on 20 mg of Lasix follow-up with me in a week.  Have her keep her feet elevated is much as possible and avoid the sodium containing foods.  Problem List Items Addressed This Visit       Primary hypertension    Hyperlipidemia     Other Visit Diagnoses       Bilateral lower extremity edema    -  Primary    BMI 27.0-27.9,adult              "

## 2025-02-24 ENCOUNTER — TELEPHONE (OUTPATIENT)
Dept: PRIMARY CARE | Facility: CLINIC | Age: 85
End: 2025-02-24
Payer: MEDICARE

## 2025-02-24 DIAGNOSIS — I10 PRIMARY HYPERTENSION: ICD-10-CM

## 2025-02-24 DIAGNOSIS — R60.0 BILATERAL LOWER EXTREMITY EDEMA: ICD-10-CM

## 2025-02-24 NOTE — TELEPHONE ENCOUNTER
Dr. Diaz Pt    Pt is calling to update doctor w/ Lasix 20mg medication that pt was put on water pill last week- Ankles are less swollen and patient is continuing to take the pill unless is told otherwise. Please advise, thank you.    Lorraine  [phone]

## 2025-02-24 NOTE — TELEPHONE ENCOUNTER
Leonidas Diaz MD  Do [id number] [facility] Clinical Support Staff42 minutes ago (1:56 PM)       Have the patient continue to take the low-dose Lasix and in 1 more week get a BMP drawn

## 2025-03-08 LAB
ANION GAP SERPL CALCULATED.4IONS-SCNC: 15 MMOL/L (CALC) (ref 7–17)
BUN SERPL-MCNC: 23 MG/DL (ref 7–25)
BUN/CREAT SERPL: 23 (CALC) (ref 6–22)
CALCIUM SERPL-MCNC: 10 MG/DL (ref 8.6–10.4)
CHLORIDE SERPL-SCNC: 99 MMOL/L (ref 98–110)
CO2 SERPL-SCNC: 29 MMOL/L (ref 20–32)
CREAT SERPL-MCNC: 1.01 MG/DL (ref 0.6–0.95)
EGFRCR SERPLBLD CKD-EPI 2021: 56 ML/MIN/1.73M2
GLUCOSE SERPL-MCNC: 163 MG/DL (ref 65–139)
POTASSIUM SERPL-SCNC: 4.4 MMOL/L (ref 3.5–5.3)
SODIUM SERPL-SCNC: 143 MMOL/L (ref 135–146)

## 2025-03-10 ENCOUNTER — TELEPHONE (OUTPATIENT)
Dept: PRIMARY CARE | Facility: CLINIC | Age: 85
End: 2025-03-10
Payer: MEDICARE

## 2025-03-10 DIAGNOSIS — R73.09 ELEVATED GLUCOSE: ICD-10-CM

## 2025-03-10 DIAGNOSIS — N28.9 FUNCTION KIDNEY DECREASED: ICD-10-CM

## 2025-03-10 NOTE — TELEPHONE ENCOUNTER
----- Message from Leonidas Diaz sent at 3/10/2025  7:46 AM EDT -----  No function slightly worse.  May be due to the Lasix.  Have her stop taking the Lasix and recheck a BMP in 2 weeks.  Also get a hemoglobin A1c at that time

## 2025-03-12 DIAGNOSIS — R60.0 LOCALIZED EDEMA: ICD-10-CM

## 2025-03-12 RX ORDER — FUROSEMIDE 20 MG/1
20 TABLET ORAL 2 TIMES DAILY
Qty: 180 TABLET | Refills: 1 | Status: SHIPPED | OUTPATIENT
Start: 2025-03-12

## 2025-03-17 ENCOUNTER — APPOINTMENT (OUTPATIENT)
Dept: PRIMARY CARE | Facility: CLINIC | Age: 85
End: 2025-03-17
Payer: MEDICARE

## 2025-03-17 ENCOUNTER — OFFICE VISIT (OUTPATIENT)
Dept: PRIMARY CARE | Facility: CLINIC | Age: 85
End: 2025-03-17
Payer: MEDICARE

## 2025-03-17 VITALS
SYSTOLIC BLOOD PRESSURE: 138 MMHG | BODY MASS INDEX: 26.24 KG/M2 | DIASTOLIC BLOOD PRESSURE: 64 MMHG | HEIGHT: 58 IN | OXYGEN SATURATION: 100 % | TEMPERATURE: 97.7 F | WEIGHT: 125 LBS | HEART RATE: 71 BPM

## 2025-03-17 DIAGNOSIS — M54.50 LOW BACK PAIN, UNSPECIFIED BACK PAIN LATERALITY, UNSPECIFIED CHRONICITY, UNSPECIFIED WHETHER SCIATICA PRESENT: ICD-10-CM

## 2025-03-17 DIAGNOSIS — B37.0 THRUSH: Primary | ICD-10-CM

## 2025-03-17 DIAGNOSIS — E78.2 MIXED HYPERLIPIDEMIA: ICD-10-CM

## 2025-03-17 DIAGNOSIS — R53.81 MALAISE AND FATIGUE: ICD-10-CM

## 2025-03-17 DIAGNOSIS — R53.83 MALAISE AND FATIGUE: ICD-10-CM

## 2025-03-17 PROCEDURE — 1159F MED LIST DOCD IN RCRD: CPT | Performed by: FAMILY MEDICINE

## 2025-03-17 PROCEDURE — 3078F DIAST BP <80 MM HG: CPT | Performed by: FAMILY MEDICINE

## 2025-03-17 PROCEDURE — 3075F SYST BP GE 130 - 139MM HG: CPT | Performed by: FAMILY MEDICINE

## 2025-03-17 PROCEDURE — 1123F ACP DISCUSS/DSCN MKR DOCD: CPT | Performed by: FAMILY MEDICINE

## 2025-03-17 PROCEDURE — 1157F ADVNC CARE PLAN IN RCRD: CPT | Performed by: FAMILY MEDICINE

## 2025-03-17 PROCEDURE — 99213 OFFICE O/P EST LOW 20 MIN: CPT | Performed by: FAMILY MEDICINE

## 2025-03-17 PROCEDURE — 1036F TOBACCO NON-USER: CPT | Performed by: FAMILY MEDICINE

## 2025-03-17 RX ORDER — CLOTRIMAZOLE 10 MG/1
10 LOZENGE ORAL
Qty: 25 TROCHE | Refills: 0 | Status: SHIPPED | OUTPATIENT
Start: 2025-03-17 | End: 2025-03-22

## 2025-03-17 ASSESSMENT — PATIENT HEALTH QUESTIONNAIRE - PHQ9
1. LITTLE INTEREST OR PLEASURE IN DOING THINGS: NOT AT ALL
SUM OF ALL RESPONSES TO PHQ9 QUESTIONS 1 AND 2: 0
2. FEELING DOWN, DEPRESSED OR HOPELESS: NOT AT ALL

## 2025-03-17 NOTE — PROGRESS NOTES
Subjective   Patient ID: Lorraine Loja is a 84 y.o. female who presents for No chief complaint on file..  HPI    Patient presents today for AWV, HTN, and HLD follow up. She does/not eat a low sodium, low cholesterol diet. She does/not exercise. He does/not check his BP at home. Last eye exam was ***. Last dental exam was ***. Is/not fasting for BW today.    Patient presents in office for tooth pain. Ongoing x ***. Patient states that she had dental surgury on *** and now has a clot in her month on *** side . Has tried ***. Admits/Denies relief.         Review of systems  ; Patient seen today for exam denies any problems with headaches or vision, denies any shortness of breath chest pain nausea or vomiting, no black stool no blood in the stool no heartburn type symptoms denies any problems with constipation or diarrhea, and no dysuria-type symptoms    The patient's allergies medications were reviewed with them today    The patient's social family and surgical history or also reviewed here today, along with her past medical history.     Objective     Alert and active in  no acute distress  HEENT TMs clear oropharynx negative nares clear no drainage noted neck supple  With no adenopathy   Heart regular rate and rhythm without murmur and no carotid bruits  Lungs- clear to auscultation bilaterally, no wheeze or rhonchi noted  Thyroid -negative masses or nodularity  Abdomen- soft times four quadrants , bowel sounds positive no masses or organomegaly, negative tenderness guarding or rebound  Neurological exam unremarkable- DTRs in upper and lower extremities within normal limits.   skin -no lesions noted      There were no vitals taken for this visit.    Allergies   Allergen Reactions    Sulfa (Sulfonamide Antibiotics) Other     Oral rash       Assessment/Plan   Problem List Items Addressed This Visit    None    Medicare wellness questionnaire reviewed in detail. Advanced Directives reviewed today, Importance of having  Advance care planing discussed. Patient advised to provide the office with a copy if has not already done so. No problems with activities of daily living. Falls risks reviewed.     If anything worsens or changes please call us at once, follow up in the office as planned,

## 2025-03-17 NOTE — PROGRESS NOTES
Subjective   Patient ID: Lorraine Loja is a 84 y.o. female who presents for No chief complaint on file..  HPI  Patient presents in the office today with concerns of coating in mouth. Patient states she got dental work on feb 6th and noticed she has a coating in her mouth and cannot see if but feels it, worries about thrush.pt states she feels like it is getting worse. Ongoing X 1 month. Has tried extra cleaning.   Review of Systems  Constitutional:  no chills, no fever and no night sweats.  Eyes: no blurred vision and no eyesight problems.  ENT: no hearing loss, no nasal congestion, no hoarseness and no sore throat.  Neck: no mass (es) and no swelling.  Cardiovascular: no chest pain, no intermittent leg claudication, no lower extremity edema, no palpitation and no syncope.  Respiratory: no cough, no shortness of breath during exertion, no shortness of breath at rest and no wheezing.  Gastrointestinal: no abdominal pain, no blood in stools, no constipation, no diarrhea, no melena, no nausea, no rectal pain and no vomiting.  Genitourinary: no dysuria, no change in urinary frequency, no urinary hesitancy and no feelings of urinary urgency.  Musculoskeletal: no arthralgias, no back pain and no myalgias.  Integumentary: no new skin lesions and no rashes.  Neurological: no difficulty walking, no headache, no limb weakness, no numbness and no tingling.  Psychiatric/Behavioral: no anxiety, no depression, no anhedonia and no substance use disorders.  Endocrine: no recent weight gain and no recent weight loss.  Hematologic/Lymphatic: no tendency for easy bruising and no swollen glands    Objective   Physical Exam  Plaints of some burning in off feels like there is a coating occasionally scrapes off some whitish material feels like it did when she had thrush in the past.  Had some dental work anesthesia recently.  She goes back and sees the dentist in a week.  Still working doing her lower teeth.  Well-developed  well-nourished female in no acute distress oral mucosa slightly erythematous there is no obvious discharge but she says she feels a coating in her mouth given the fact that she has had this before and felt the same way will put her on Mycelex atrocious 5 a day for the next 5 days if she is still having problems she will let us know she is not on any new medications.  Lungs are clear cardiac and abdominal exams are benign.  No cervical adenopathy.  There were no vitals taken for this visit.    Lab Results   Component Value Date    WBC 9.7 12/14/2024    HGB 13.2 12/14/2024    HCT 39.5 12/14/2024    MCV 97 12/14/2024     12/14/2024       Assessment/Plan plan follow-up if not improved also check in with the oral surgeon  Problem List Items Addressed This Visit    None  Patient was identified as a fall risk. Risk prevention instructions provided.

## 2025-03-25 ENCOUNTER — TELEPHONE (OUTPATIENT)
Dept: PRIMARY CARE | Facility: CLINIC | Age: 85
End: 2025-03-25
Payer: MEDICARE

## 2025-03-25 NOTE — TELEPHONE ENCOUNTER
If this did not clear it it is not thrush.  May be viral inflammation causing the soreness I would give it another 2 weeks if it still bothering her then we can try to get her in         Called patient and she is aware

## 2025-03-25 NOTE — TELEPHONE ENCOUNTER
Leonidas Diaz MD  Do [id number] minutes ago (11:40 AM)       If this did not clear it it is not thrush.  May be viral inflammation causing the soreness I would give it another 2 weeks if it still bothering her then we can try to get her in

## 2025-03-25 NOTE — TELEPHONE ENCOUNTER
PATIENT IS CALLING - LAST SAW YOU ON 3/17/25 FOR THRUSH - SHE IS DONE WITH THE MEDICATION THAT WAS PRESCRIBED ( CLOTRIMAZOLE 10 MG - 1 TABLET IN THE MOUTH OR THROAT ONCE DAILY FOR 5 DAYS) - IS NO BETTER - WONDERING  WHAT YOU RECOMMEND?  PLEASE ADVISE.      [facility] IS PREFERRED [facility].

## 2025-03-27 DIAGNOSIS — R73.09 ELEVATED GLUCOSE: ICD-10-CM

## 2025-03-27 DIAGNOSIS — N28.9 FUNCTION KIDNEY DECREASED: ICD-10-CM

## 2025-04-03 LAB
ANION GAP SERPL CALCULATED.4IONS-SCNC: 11 MMOL/L (CALC) (ref 7–17)
BUN SERPL-MCNC: 18 MG/DL (ref 7–25)
BUN/CREAT SERPL: ABNORMAL (CALC) (ref 6–22)
CALCIUM SERPL-MCNC: 9.7 MG/DL (ref 8.6–10.4)
CHLORIDE SERPL-SCNC: 104 MMOL/L (ref 98–110)
CO2 SERPL-SCNC: 26 MMOL/L (ref 20–32)
CREAT SERPL-MCNC: 0.92 MG/DL (ref 0.6–0.95)
EGFRCR SERPLBLD CKD-EPI 2021: 62 ML/MIN/1.73M2
EST. AVERAGE GLUCOSE BLD GHB EST-MCNC: 123 MG/DL
EST. AVERAGE GLUCOSE BLD GHB EST-SCNC: 6.8 MMOL/L
GLUCOSE SERPL-MCNC: 105 MG/DL (ref 65–99)
HBA1C MFR BLD: 5.9 % OF TOTAL HGB
POTASSIUM SERPL-SCNC: 4.8 MMOL/L (ref 3.5–5.3)
SODIUM SERPL-SCNC: 141 MMOL/L (ref 135–146)

## 2025-04-08 ENCOUNTER — TELEPHONE (OUTPATIENT)
Dept: PRIMARY CARE | Facility: CLINIC | Age: 85
End: 2025-04-08
Payer: MEDICARE

## 2025-04-08 NOTE — TELEPHONE ENCOUNTER
----- Message from Gertrudis Heard sent at 4/7/2025  8:40 PM EDT -----  DR. SOSA PATIENT - Covering while he is out of the office.    Please call Lorraine Loja and let her know labs show prediabetes, with mildly elevated A1c. Cut back on simple carbs/sweets. Everything else looks ok.    Thanks,  Gertrudis

## 2025-04-22 ENCOUNTER — APPOINTMENT (OUTPATIENT)
Dept: DERMATOLOGY | Facility: CLINIC | Age: 85
End: 2025-04-22
Payer: MEDICARE

## 2025-04-23 ENCOUNTER — APPOINTMENT (OUTPATIENT)
Dept: PRIMARY CARE | Facility: CLINIC | Age: 85
End: 2025-04-23
Payer: MEDICARE

## 2025-04-23 VITALS
SYSTOLIC BLOOD PRESSURE: 132 MMHG | TEMPERATURE: 96.7 F | HEIGHT: 58 IN | WEIGHT: 125.8 LBS | DIASTOLIC BLOOD PRESSURE: 60 MMHG | OXYGEN SATURATION: 97 % | BODY MASS INDEX: 26.41 KG/M2 | HEART RATE: 70 BPM

## 2025-04-23 DIAGNOSIS — M25.562 LEFT KNEE PAIN, UNSPECIFIED CHRONICITY: ICD-10-CM

## 2025-04-23 DIAGNOSIS — E78.2 MIXED HYPERLIPIDEMIA: ICD-10-CM

## 2025-04-23 DIAGNOSIS — R73.03 PRE-DIABETES: Primary | ICD-10-CM

## 2025-04-23 DIAGNOSIS — I10 PRIMARY HYPERTENSION: ICD-10-CM

## 2025-04-23 PROCEDURE — 3078F DIAST BP <80 MM HG: CPT | Performed by: FAMILY MEDICINE

## 2025-04-23 PROCEDURE — 1159F MED LIST DOCD IN RCRD: CPT | Performed by: FAMILY MEDICINE

## 2025-04-23 PROCEDURE — 3075F SYST BP GE 130 - 139MM HG: CPT | Performed by: FAMILY MEDICINE

## 2025-04-23 PROCEDURE — 99213 OFFICE O/P EST LOW 20 MIN: CPT | Performed by: FAMILY MEDICINE

## 2025-04-23 PROCEDURE — 1123F ACP DISCUSS/DSCN MKR DOCD: CPT | Performed by: FAMILY MEDICINE

## 2025-04-23 PROCEDURE — 1157F ADVNC CARE PLAN IN RCRD: CPT | Performed by: FAMILY MEDICINE

## 2025-04-23 ASSESSMENT — PATIENT HEALTH QUESTIONNAIRE - PHQ9
SUM OF ALL RESPONSES TO PHQ9 QUESTIONS 1 AND 2: 0
2. FEELING DOWN, DEPRESSED OR HOPELESS: NOT AT ALL
1. LITTLE INTEREST OR PLEASURE IN DOING THINGS: NOT AT ALL

## 2025-04-23 NOTE — PROGRESS NOTES
Subjective   Patient ID: Lorraine Loja is a 84 y.o. female who presents for prediabetic and Knee Pain.  HPI    Patient presents in the office today to follow up on labs. Per Gertrudis patient is in the prediabetic range. Patient states that she is not experiencing any symptoms. Patient states she has cut back on sweets and only eats a couple cookies at night before bedtime. Patient denies family history of diabetes.    Patient also has complaints that her left knee has been having pain. Patient has had it replaced about 22 years ago. Patient states the pain has been going on for a couple of months. Has tried physical therapy and it has made it worse.     Review of Systems  Constitutional:  no chills, no fever and no night sweats.  Eyes: no blurred vision and no eyesight problems.  ENT: no hearing loss, no nasal congestion, no hoarseness and no sore throat.  Neck: no mass (es) and no swelling.  Cardiovascular: no chest pain, no intermittent leg claudication, no lower extremity edema, no palpitation and no syncope.  Respiratory: no cough, no shortness of breath during exertion, no shortness of breath at rest and no wheezing.  Gastrointestinal: no abdominal pain, no blood in stools, no constipation, no diarrhea, no melena, no nausea, no rectal pain and no vomiting.  Genitourinary: no dysuria, no change in urinary frequency, no urinary hesitancy and no feelings of urinary urgency.  Musculoskeletal: no arthralgias, no back pain and no myalgias.  Integumentary: no new skin lesions and no rashes.  Neurological: no difficulty walking, no headache, no limb weakness, no numbness and no tingling.  Psychiatric/Behavioral: no anxiety, no depression, no anhedonia and no substance use disorders.  Endocrine: no recent weight gain and no recent weight loss.  Hematologic/Lymphatic: no tendency for easy bruising and no swollen glands    Objective   Physical Exam  Patient in for follow-up and was told that she is prediabetic at this  "point her A1c is normal blood glucose slightly elevated but not in diabetic range or prediabetic range.  She is having some worsening intermittent left knee pain with history of partial knee replacement approximately 22 years ago would refer her to orthopedics to see if anything more needs to be done.  At this point she says is not bothering her bad enough for her to want to consider more surgery she will let us know if that changes otherwise doing well did not denies chest pain or shortness of breath with exertion.  Physical exam today's office visit constitutional alert and oriented x3.    Head is atraumatic HEENT is within normal limits.    Neck supple no masses full range of motion.    Thyroid is normal in size no thyromegaly there is no carotid bruits.    Pulmonary exam shows clear to auscultation no respiratory distress.    Cardiovascular shows no murmur rub or gallop.  Regular rate and rhythm.    Abdominal exam soft nontender no hepatosplenomegaly or masses normal bowel sounds no rebound no guarding.    Musculoskeletal exam no joint pain no muscle pain full range of motion.    Psych exam normal mood and affect.    Dermatologic exam no skin lesions no rash no blemishes.    Neuro exam is no focal deficits.  Normal exam.    Extremities no edema normal pulses normal capillary refill.    /60 (BP Location: Left arm, Patient Position: Sitting)   Pulse 70   Temp 35.9 °C (96.7 °F) (Temporal)   Ht 1.473 m (4' 10\")   Wt 57.1 kg (125 lb 12.8 oz)   SpO2 97%   BMI 26.29 kg/m²     Lab Results   Component Value Date    WBC 9.7 12/14/2024    HGB 13.2 12/14/2024    HCT 39.5 12/14/2024    MCV 97 12/14/2024     12/14/2024       Assessment/Plan continue current treatment routine recheck 6 months or as needed  Problem List Items Addressed This Visit       BMI 26.0-26.9,adult    Primary hypertension    Hyperlipidemia     Other Visit Diagnoses         Pre-diabetes    -  Primary      Left knee pain, unspecified " chronicity

## 2025-05-13 DIAGNOSIS — E78.5 HYPERLIPIDEMIA, UNSPECIFIED HYPERLIPIDEMIA TYPE: ICD-10-CM

## 2025-05-13 DIAGNOSIS — I10 PRIMARY HYPERTENSION: ICD-10-CM

## 2025-05-14 RX ORDER — ATORVASTATIN CALCIUM 20 MG/1
20 TABLET, FILM COATED ORAL DAILY
Qty: 90 TABLET | Refills: 1 | Status: SHIPPED | OUTPATIENT
Start: 2025-05-14

## 2025-05-14 RX ORDER — LISINOPRIL 10 MG/1
10 TABLET ORAL DAILY
Qty: 90 TABLET | Refills: 1 | Status: SHIPPED | OUTPATIENT
Start: 2025-05-14

## 2025-05-14 RX ORDER — METOPROLOL TARTRATE 50 MG/1
50 TABLET ORAL 2 TIMES DAILY
Qty: 180 TABLET | Refills: 1 | Status: SHIPPED | OUTPATIENT
Start: 2025-05-14

## 2025-10-22 ENCOUNTER — APPOINTMENT (OUTPATIENT)
Dept: PRIMARY CARE | Facility: CLINIC | Age: 85
End: 2025-10-22
Payer: MEDICARE